# Patient Record
Sex: FEMALE | Race: OTHER | HISPANIC OR LATINO | Employment: UNEMPLOYED | URBAN - METROPOLITAN AREA
[De-identification: names, ages, dates, MRNs, and addresses within clinical notes are randomized per-mention and may not be internally consistent; named-entity substitution may affect disease eponyms.]

---

## 2017-02-01 ENCOUNTER — HOSPITAL ENCOUNTER (EMERGENCY)
Facility: HOSPITAL | Age: 22
Discharge: HOME | End: 2017-02-01
Attending: EMERGENCY MEDICINE
Payer: COMMERCIAL

## 2017-02-01 VITALS
RESPIRATION RATE: 16 BRPM | DIASTOLIC BLOOD PRESSURE: 55 MMHG | OXYGEN SATURATION: 100 % | TEMPERATURE: 98.1 F | SYSTOLIC BLOOD PRESSURE: 103 MMHG | HEART RATE: 75 BPM

## 2017-02-01 DIAGNOSIS — N39.0 URINARY TRACT INFECTION: ICD-10-CM

## 2017-02-01 DIAGNOSIS — R10.9 ABDOMINAL PAIN: ICD-10-CM

## 2017-02-01 DIAGNOSIS — Z34.90 INTRAUTERINE PREGNANCY: Primary | ICD-10-CM

## 2017-02-01 LAB
ALBUMIN SERPL BCP-MCNC: 4 G/DL (ref 3.5–5)
ALP SERPL-CCNC: 79 U/L (ref 46–116)
ALT SERPL W P-5'-P-CCNC: 15 U/L (ref 12–78)
ANION GAP SERPL CALCULATED.3IONS-SCNC: 8 MMOL/L (ref 4–13)
AST SERPL W P-5'-P-CCNC: 13 U/L (ref 5–45)
B-HCG SERPL-ACNC: ABNORMAL MIU/ML
BACTERIA UR QL AUTO: ABNORMAL /HPF
BASOPHILS # BLD AUTO: 0.02 THOUSANDS/ΜL (ref 0–0.1)
BASOPHILS NFR BLD AUTO: 0 % (ref 0–1)
BILIRUB SERPL-MCNC: 0.14 MG/DL (ref 0.2–1)
BILIRUB UR QL STRIP: NEGATIVE
BUN SERPL-MCNC: 13 MG/DL (ref 5–25)
CALCIUM SERPL-MCNC: 9.1 MG/DL (ref 8.3–10.1)
CHLORIDE SERPL-SCNC: 102 MMOL/L (ref 100–108)
CLARITY UR: CLEAR
CO2 SERPL-SCNC: 28 MMOL/L (ref 21–32)
COLOR UR: YELLOW
CREAT SERPL-MCNC: 0.69 MG/DL (ref 0.6–1.3)
EOSINOPHIL # BLD AUTO: 0.08 THOUSAND/ΜL (ref 0–0.61)
EOSINOPHIL NFR BLD AUTO: 1 % (ref 0–6)
ERYTHROCYTE [DISTWIDTH] IN BLOOD BY AUTOMATED COUNT: 13.5 % (ref 11.6–15.1)
GFR SERPL CREATININE-BSD FRML MDRD: >60 ML/MIN/1.73SQ M
GLUCOSE SERPL-MCNC: 80 MG/DL (ref 65–140)
GLUCOSE UR STRIP-MCNC: NEGATIVE MG/DL
HCG UR QL: POSITIVE
HCT VFR BLD AUTO: 38.7 % (ref 34.8–46.1)
HGB BLD-MCNC: 13.1 G/DL (ref 11.5–15.4)
HGB UR QL STRIP.AUTO: NEGATIVE
KETONES UR STRIP-MCNC: NEGATIVE MG/DL
LEUKOCYTE ESTERASE UR QL STRIP: ABNORMAL
LIPASE SERPL-CCNC: 190 U/L (ref 73–393)
LYMPHOCYTES # BLD AUTO: 2.77 THOUSANDS/ΜL (ref 0.6–4.47)
LYMPHOCYTES NFR BLD AUTO: 35 % (ref 14–44)
MCH RBC QN AUTO: 28.7 PG (ref 26.8–34.3)
MCHC RBC AUTO-ENTMCNC: 33.9 G/DL (ref 31.4–37.4)
MCV RBC AUTO: 85 FL (ref 82–98)
MONOCYTES # BLD AUTO: 0.55 THOUSAND/ΜL (ref 0.17–1.22)
MONOCYTES NFR BLD AUTO: 7 % (ref 4–12)
NEUTROPHILS # BLD AUTO: 4.61 THOUSANDS/ΜL (ref 1.85–7.62)
NEUTS SEG NFR BLD AUTO: 57 % (ref 43–75)
NITRITE UR QL STRIP: NEGATIVE
NON-SQ EPI CELLS URNS QL MICRO: ABNORMAL /HPF
NRBC BLD AUTO-RTO: 0 /100 WBCS
PH UR STRIP.AUTO: 6 [PH] (ref 4.5–8)
PLATELET # BLD AUTO: 283 THOUSANDS/UL (ref 149–390)
PMV BLD AUTO: 9.3 FL (ref 8.9–12.7)
POTASSIUM SERPL-SCNC: 3.4 MMOL/L (ref 3.5–5.3)
PROT SERPL-MCNC: 7.8 G/DL (ref 6.4–8.2)
PROT UR STRIP-MCNC: ABNORMAL MG/DL
RBC # BLD AUTO: 4.56 MILLION/UL (ref 3.81–5.12)
RBC #/AREA URNS AUTO: ABNORMAL /HPF
SODIUM SERPL-SCNC: 138 MMOL/L (ref 136–145)
SP GR UR STRIP.AUTO: >=1.03 (ref 1–1.03)
UROBILINOGEN UR QL STRIP.AUTO: 0.2 E.U./DL
WBC # BLD AUTO: 8.03 THOUSAND/UL (ref 4.31–10.16)
WBC #/AREA URNS AUTO: ABNORMAL /HPF

## 2017-02-01 PROCEDURE — 81001 URINALYSIS AUTO W/SCOPE: CPT

## 2017-02-01 PROCEDURE — 36415 COLL VENOUS BLD VENIPUNCTURE: CPT

## 2017-02-01 PROCEDURE — 87086 URINE CULTURE/COLONY COUNT: CPT

## 2017-02-01 PROCEDURE — 81025 URINE PREGNANCY TEST: CPT

## 2017-02-01 PROCEDURE — 85025 COMPLETE CBC W/AUTO DIFF WBC: CPT

## 2017-02-01 PROCEDURE — 84702 CHORIONIC GONADOTROPIN TEST: CPT | Performed by: EMERGENCY MEDICINE

## 2017-02-01 PROCEDURE — 81002 URINALYSIS NONAUTO W/O SCOPE: CPT

## 2017-02-01 PROCEDURE — 99284 EMERGENCY DEPT VISIT MOD MDM: CPT

## 2017-02-01 PROCEDURE — 80053 COMPREHEN METABOLIC PANEL: CPT

## 2017-02-01 PROCEDURE — 83690 ASSAY OF LIPASE: CPT | Performed by: EMERGENCY MEDICINE

## 2017-02-01 RX ORDER — ACETAMINOPHEN 325 MG/1
650 TABLET ORAL ONCE
Status: COMPLETED | OUTPATIENT
Start: 2017-02-01 | End: 2017-02-01

## 2017-02-01 RX ORDER — CEPHALEXIN 250 MG/1
500 CAPSULE ORAL ONCE
Status: COMPLETED | OUTPATIENT
Start: 2017-02-01 | End: 2017-02-01

## 2017-02-01 RX ORDER — CEPHALEXIN 500 MG/1
500 CAPSULE ORAL 2 TIMES DAILY
Qty: 14 CAPSULE | Refills: 0 | Status: SHIPPED | OUTPATIENT
Start: 2017-02-01 | End: 2017-02-08

## 2017-02-01 RX ADMIN — CEPHALEXIN 500 MG: 250 CAPSULE ORAL at 22:52

## 2017-02-01 RX ADMIN — ACETAMINOPHEN 650 MG: 325 TABLET, FILM COATED ORAL at 22:15

## 2017-02-02 LAB — BACTERIA UR CULT: NORMAL

## 2017-02-24 ENCOUNTER — HOSPITAL ENCOUNTER (EMERGENCY)
Facility: HOSPITAL | Age: 22
Discharge: HOME | End: 2017-02-24
Attending: EMERGENCY MEDICINE | Admitting: EMERGENCY MEDICINE
Payer: COMMERCIAL

## 2017-02-24 VITALS
WEIGHT: 127 LBS | RESPIRATION RATE: 14 BRPM | DIASTOLIC BLOOD PRESSURE: 54 MMHG | SYSTOLIC BLOOD PRESSURE: 100 MMHG | OXYGEN SATURATION: 100 % | HEART RATE: 89 BPM | TEMPERATURE: 97.9 F

## 2017-02-24 DIAGNOSIS — R11.10 VOMITING: Primary | ICD-10-CM

## 2017-02-24 DIAGNOSIS — E86.0 DEHYDRATION: ICD-10-CM

## 2017-02-24 DIAGNOSIS — Z3A.10 10 WEEKS GESTATION OF PREGNANCY: ICD-10-CM

## 2017-02-24 LAB
ALBUMIN SERPL BCP-MCNC: 3.7 G/DL (ref 3.5–5)
ALP SERPL-CCNC: 64 U/L (ref 46–116)
ALT SERPL W P-5'-P-CCNC: 18 U/L (ref 12–78)
ANION GAP SERPL CALCULATED.3IONS-SCNC: 14 MMOL/L (ref 4–13)
AST SERPL W P-5'-P-CCNC: 16 U/L (ref 5–45)
BASOPHILS # BLD MANUAL: 0 THOUSAND/UL (ref 0–0.1)
BASOPHILS NFR MAR MANUAL: 0 % (ref 0–1)
BILIRUB SERPL-MCNC: 0.58 MG/DL (ref 0.2–1)
BUN SERPL-MCNC: 10 MG/DL (ref 5–25)
CALCIUM SERPL-MCNC: 8.9 MG/DL (ref 8.3–10.1)
CHLORIDE SERPL-SCNC: 101 MMOL/L (ref 100–108)
CO2 SERPL-SCNC: 24 MMOL/L (ref 21–32)
CREAT SERPL-MCNC: 0.81 MG/DL (ref 0.6–1.3)
EOSINOPHIL # BLD MANUAL: 0 THOUSAND/UL (ref 0–0.4)
EOSINOPHIL NFR BLD MANUAL: 0 % (ref 0–6)
ERYTHROCYTE [DISTWIDTH] IN BLOOD BY AUTOMATED COUNT: 13.8 % (ref 11.6–15.1)
GFR SERPL CREATININE-BSD FRML MDRD: >60 ML/MIN/1.73SQ M
GLUCOSE SERPL-MCNC: 124 MG/DL (ref 65–140)
HCT VFR BLD AUTO: 41.6 % (ref 34.8–46.1)
HGB BLD-MCNC: 14.6 G/DL (ref 11.5–15.4)
LYMPHOCYTES # BLD AUTO: 0.62 THOUSAND/UL (ref 0.6–4.47)
LYMPHOCYTES # BLD AUTO: 8 % (ref 14–44)
MCH RBC QN AUTO: 29.4 PG (ref 26.8–34.3)
MCHC RBC AUTO-ENTMCNC: 35.1 G/DL (ref 31.4–37.4)
MCV RBC AUTO: 84 FL (ref 82–98)
MONOCYTES # BLD AUTO: 0.08 THOUSAND/UL (ref 0–1.22)
MONOCYTES NFR BLD: 1 % (ref 4–12)
NEUTROPHILS # BLD MANUAL: 7.1 THOUSAND/UL (ref 1.85–7.62)
NEUTS BAND NFR BLD MANUAL: 13 % (ref 0–8)
NEUTS SEG NFR BLD AUTO: 78 % (ref 43–75)
NRBC BLD AUTO-RTO: 0 /100 WBCS
PLATELET # BLD AUTO: 200 THOUSANDS/UL (ref 149–390)
PLATELET BLD QL SMEAR: ADEQUATE
PMV BLD AUTO: 9.5 FL (ref 8.9–12.7)
POTASSIUM SERPL-SCNC: 3.5 MMOL/L (ref 3.5–5.3)
PROT SERPL-MCNC: 8 G/DL (ref 6.4–8.2)
RBC # BLD AUTO: 4.96 MILLION/UL (ref 3.81–5.12)
RBC MORPH BLD: NORMAL
SODIUM SERPL-SCNC: 139 MMOL/L (ref 136–145)
TOTAL CELLS COUNTED SPEC: 100
WBC # BLD AUTO: 7.8 THOUSAND/UL (ref 4.31–10.16)

## 2017-02-24 PROCEDURE — 85007 BL SMEAR W/DIFF WBC COUNT: CPT | Performed by: EMERGENCY MEDICINE

## 2017-02-24 PROCEDURE — 96361 HYDRATE IV INFUSION ADD-ON: CPT

## 2017-02-24 PROCEDURE — 36415 COLL VENOUS BLD VENIPUNCTURE: CPT | Performed by: EMERGENCY MEDICINE

## 2017-02-24 PROCEDURE — 99283 EMERGENCY DEPT VISIT LOW MDM: CPT

## 2017-02-24 PROCEDURE — 80053 COMPREHEN METABOLIC PANEL: CPT | Performed by: EMERGENCY MEDICINE

## 2017-02-24 PROCEDURE — 85027 COMPLETE CBC AUTOMATED: CPT | Performed by: EMERGENCY MEDICINE

## 2017-02-24 PROCEDURE — 96374 THER/PROPH/DIAG INJ IV PUSH: CPT

## 2017-02-24 RX ORDER — METOCLOPRAMIDE HYDROCHLORIDE 5 MG/ML
10 INJECTION INTRAMUSCULAR; INTRAVENOUS ONCE
Status: COMPLETED | OUTPATIENT
Start: 2017-02-24 | End: 2017-02-24

## 2017-02-24 RX ORDER — METOCLOPRAMIDE 10 MG/1
10 TABLET ORAL EVERY 6 HOURS PRN
Qty: 30 TABLET | Refills: 0 | Status: SHIPPED | OUTPATIENT
Start: 2017-02-24 | End: 2017-03-06

## 2017-02-24 RX ADMIN — SODIUM CHLORIDE 1000 ML: 0.9 INJECTION, SOLUTION INTRAVENOUS at 05:57

## 2017-02-24 RX ADMIN — METOCLOPRAMIDE 10 MG: 5 INJECTION, SOLUTION INTRAMUSCULAR; INTRAVENOUS at 05:56

## 2018-10-06 PROCEDURE — 99284 EMERGENCY DEPT VISIT MOD MDM: CPT

## 2018-10-07 ENCOUNTER — APPOINTMENT (EMERGENCY)
Dept: ULTRASOUND IMAGING | Facility: HOSPITAL | Age: 23
End: 2018-10-07
Payer: COMMERCIAL

## 2018-10-07 ENCOUNTER — HOSPITAL ENCOUNTER (EMERGENCY)
Facility: HOSPITAL | Age: 23
Discharge: HOME | End: 2018-10-07
Attending: EMERGENCY MEDICINE | Admitting: EMERGENCY MEDICINE
Payer: COMMERCIAL

## 2018-10-07 VITALS
RESPIRATION RATE: 16 BRPM | WEIGHT: 125 LBS | OXYGEN SATURATION: 98 % | HEART RATE: 73 BPM | SYSTOLIC BLOOD PRESSURE: 94 MMHG | TEMPERATURE: 98 F | DIASTOLIC BLOOD PRESSURE: 51 MMHG

## 2018-10-07 DIAGNOSIS — O26.899 PELVIC PAIN DURING PREGNANCY: Primary | ICD-10-CM

## 2018-10-07 DIAGNOSIS — B37.3 VAGINAL YEAST INFECTION: ICD-10-CM

## 2018-10-07 DIAGNOSIS — R10.2 PELVIC PAIN DURING PREGNANCY: Primary | ICD-10-CM

## 2018-10-07 LAB
ABO GROUP BLD: NORMAL
ALBUMIN SERPL BCP-MCNC: 3.2 G/DL (ref 3.5–5)
ALP SERPL-CCNC: 60 U/L (ref 46–116)
ALT SERPL W P-5'-P-CCNC: 11 U/L (ref 12–78)
ANION GAP SERPL CALCULATED.3IONS-SCNC: 6 MMOL/L (ref 4–13)
AST SERPL W P-5'-P-CCNC: 12 U/L (ref 5–45)
B-HCG SERPL-ACNC: ABNORMAL MIU/ML
BACTERIA UR QL AUTO: ABNORMAL /HPF
BASOPHILS # BLD AUTO: 0.03 THOUSANDS/ΜL (ref 0–0.1)
BASOPHILS NFR BLD AUTO: 1 % (ref 0–1)
BILIRUB SERPL-MCNC: 0.23 MG/DL (ref 0.2–1)
BILIRUB UR QL STRIP: NEGATIVE
BLD GP AB SCN SERPL QL: NEGATIVE
BUN SERPL-MCNC: 8 MG/DL (ref 5–25)
CALCIUM SERPL-MCNC: 8.9 MG/DL (ref 8.3–10.1)
CHLORIDE SERPL-SCNC: 104 MMOL/L (ref 100–108)
CLARITY UR: CLEAR
CO2 SERPL-SCNC: 26 MMOL/L (ref 21–32)
COLOR UR: YELLOW
COLOR, POC: YELLOW
CREAT SERPL-MCNC: 0.57 MG/DL (ref 0.6–1.3)
EOSINOPHIL # BLD AUTO: 0.05 THOUSAND/ΜL (ref 0–0.61)
EOSINOPHIL NFR BLD AUTO: 1 % (ref 0–6)
ERYTHROCYTE [DISTWIDTH] IN BLOOD BY AUTOMATED COUNT: 13.9 % (ref 11.6–15.1)
GFR SERPL CREATININE-BSD FRML MDRD: 132 ML/MIN/1.73SQ M
GLUCOSE SERPL-MCNC: 87 MG/DL (ref 65–140)
GLUCOSE UR STRIP-MCNC: NEGATIVE MG/DL
HCT VFR BLD AUTO: 35.7 % (ref 34.8–46.1)
HGB BLD-MCNC: 11.1 G/DL (ref 11.5–15.4)
HGB UR QL STRIP.AUTO: NEGATIVE
IMM GRANULOCYTES # BLD AUTO: 0.01 THOUSAND/UL (ref 0–0.2)
IMM GRANULOCYTES NFR BLD AUTO: 0 % (ref 0–2)
KETONES UR STRIP-MCNC: NEGATIVE MG/DL
LEUKOCYTE ESTERASE UR QL STRIP: ABNORMAL
LYMPHOCYTES # BLD AUTO: 2.16 THOUSANDS/ΜL (ref 0.6–4.47)
LYMPHOCYTES NFR BLD AUTO: 36 % (ref 14–44)
MCH RBC QN AUTO: 26.9 PG (ref 26.8–34.3)
MCHC RBC AUTO-ENTMCNC: 31.1 G/DL (ref 31.4–37.4)
MCV RBC AUTO: 86 FL (ref 82–98)
MONOCYTES # BLD AUTO: 0.41 THOUSAND/ΜL (ref 0.17–1.22)
MONOCYTES NFR BLD AUTO: 7 % (ref 4–12)
NEUTROPHILS # BLD AUTO: 3.32 THOUSANDS/ΜL (ref 1.85–7.62)
NEUTS SEG NFR BLD AUTO: 55 % (ref 43–75)
NITRITE UR QL STRIP: NEGATIVE
NON-SQ EPI CELLS URNS QL MICRO: ABNORMAL /HPF
NRBC BLD AUTO-RTO: 0 /100 WBCS
PH UR STRIP.AUTO: 6 [PH] (ref 4.5–8)
PLATELET # BLD AUTO: 260 THOUSANDS/UL (ref 149–390)
PMV BLD AUTO: 9.6 FL (ref 8.9–12.7)
POTASSIUM SERPL-SCNC: 4.2 MMOL/L (ref 3.5–5.3)
PROT SERPL-MCNC: 6.9 G/DL (ref 6.4–8.2)
PROT UR STRIP-MCNC: NEGATIVE MG/DL
RBC # BLD AUTO: 4.13 MILLION/UL (ref 3.81–5.12)
RBC #/AREA URNS AUTO: ABNORMAL /HPF
RH BLD: NEGATIVE
SODIUM SERPL-SCNC: 136 MMOL/L (ref 136–145)
SP GR UR STRIP.AUTO: >=1.03 (ref 1–1.03)
SPECIMEN EXPIRATION DATE: NORMAL
UROBILINOGEN UR QL STRIP.AUTO: 0.2 E.U./DL
WBC # BLD AUTO: 5.98 THOUSAND/UL (ref 4.31–10.16)
WBC #/AREA URNS AUTO: ABNORMAL /HPF

## 2018-10-07 PROCEDURE — 76801 OB US < 14 WKS SINGLE FETUS: CPT

## 2018-10-07 PROCEDURE — 84702 CHORIONIC GONADOTROPIN TEST: CPT | Performed by: PHYSICIAN ASSISTANT

## 2018-10-07 PROCEDURE — 87086 URINE CULTURE/COLONY COUNT: CPT

## 2018-10-07 PROCEDURE — 86901 BLOOD TYPING SEROLOGIC RH(D): CPT | Performed by: PHYSICIAN ASSISTANT

## 2018-10-07 PROCEDURE — 85025 COMPLETE CBC W/AUTO DIFF WBC: CPT | Performed by: PHYSICIAN ASSISTANT

## 2018-10-07 PROCEDURE — 80053 COMPREHEN METABOLIC PANEL: CPT | Performed by: PHYSICIAN ASSISTANT

## 2018-10-07 PROCEDURE — 81001 URINALYSIS AUTO W/SCOPE: CPT

## 2018-10-07 PROCEDURE — 36415 COLL VENOUS BLD VENIPUNCTURE: CPT | Performed by: PHYSICIAN ASSISTANT

## 2018-10-07 PROCEDURE — 86900 BLOOD TYPING SEROLOGIC ABO: CPT | Performed by: PHYSICIAN ASSISTANT

## 2018-10-07 PROCEDURE — 86850 RBC ANTIBODY SCREEN: CPT | Performed by: PHYSICIAN ASSISTANT

## 2018-10-07 RX ORDER — ACETAMINOPHEN 325 MG/1
975 TABLET ORAL ONCE
Status: DISCONTINUED | OUTPATIENT
Start: 2018-10-07 | End: 2018-10-07 | Stop reason: HOSPADM

## 2018-10-07 RX ORDER — ACETAMINOPHEN 500 MG
500 TABLET ORAL EVERY 6 HOURS PRN
Qty: 30 TABLET | Refills: 0 | Status: SHIPPED | OUTPATIENT
Start: 2018-10-07 | End: 2019-05-14 | Stop reason: ALTCHOICE

## 2018-10-07 RX ORDER — CLOTRIMAZOLE 1 %
1 CREAM WITH APPLICATOR VAGINAL
Qty: 45 G | Refills: 0 | Status: SHIPPED | OUTPATIENT
Start: 2018-10-07 | End: 2018-10-14

## 2018-10-07 NOTE — ED NOTES
Pt reports having low abd pain and white vaginal discharge  Pt states she found out she was pregnant in August but has had no prenatal care  Pt denies urinary symptoms  Pt had diarrhea today and states nausea with pregnancy is normal for her        Micki Hamilton RN  10/07/18 0551

## 2018-10-07 NOTE — DISCHARGE INSTRUCTIONS
Abdominal Pain in Pregnancy   WHAT YOU NEED TO KNOW:   Abdominal pain during pregnancy is common  Some of the causes include heartburn, constipation, gas, false labor, and round ligament pain  Round ligament pain is caused by stretching of the ligaments that support your uterus  Abdominal pain may be caused by a health problem, such as a stomach virus or appendicitis (inflammation of the appendix)  The pain may also be caused by a problem with your pregnancy, such as a threatened miscarriage or  labor  DISCHARGE INSTRUCTIONS:   Follow up with your obstetrician within 3 days:  Write down your questions so you remember to ask them during your visits  Self-care:   · Rest may help to relieve round ligament pain  Ask your healthcare provider about other ways to relieve this pain, such as a supportive belt or pregnancy exercises  · Use a heating pad set to the lowest setting or a hot compress to apply heat to your abdomen  Do this for 20 to 30 minutes every 2 hours for as many days as directed  · Avoid quick changes in position or movements that cause pain  · Do not lie flat in bed or bend over if you have heartburn  Ask your obstetrician if you should make any changes to the foods you eat  Ask if you can take any medicines for heartburn  · Eat more fiber and drink more liquids to relieve constipation  Fiber is found in fruits, vegetables, and whole-grain foods, such as whole-wheat bread and cereals  Ask how much liquid to drink each day and which liquids are best for you  Contact your obstetrician if:  · You continue to have abdominal pain that cannot be relieved  · You have a fever  · You have questions or concerns about your condition or care  Return to the emergency department if:   · You have sudden, severe pain or cramps that are so bad that you cannot walk or talk  · You have a fast heartbeat, shortness of breath, and you feel lightheaded or faint       · You have vaginal bleeding or discharge  · You have nausea, vomiting, fever, and severe pain on your right side  © 2017 [employer] Information is for End User's use only and may not be sold, redistributed or otherwise used for commercial purposes  All illustrations and images included in CareNotes® are the copyrighted property of A D A M , Inc  or Matt Zap[employer]  The above information is an  only  It is not intended as medical advice for individual conditions or treatments  Talk to your doctor, nurse or pharmacist before following any medical regimen to see if it is safe and effective for you  Vulvovaginal Candidiasis   WHAT YOU NEED TO KNOW:   Vulvovaginal candidiasis, or yeast infection, is a common vaginal infection  Vulvovaginal candidiasis is caused by a fungus, or yeast-like germ  Fungi are normally found in your vagina  When there are too many fungi, it can cause an infection  DISCHARGE INSTRUCTIONS:   Return to the emergency department if:   · You have fever and chills  · You are bleeding from your vagina and it is not your monthly period  · You develop abdominal or pelvic pain  Contact your healthcare provider if:   · Your signs and symptoms get worse, even after treatment  · You have questions or concerns about your condition or care  Medicines:   · Medicines  help treat the fungal infection and decrease inflammation  The medicine may be a pill, topical cream, or vaginal suppository  · Take your medicine as directed  Contact your healthcare provider if you think your medicine is not helping or if you have side effects  Tell him of her if you are allergic to any medicine  Keep a list of the medicines, vitamins, and herbs you take  Include the amounts, and when and why you take them  Bring the list or the pill bottles to follow-up visits  Carry your medicine list with you in case of an emergency  Manage your symptoms:   · Wear cotton underwear      · Keep the vaginal area clean and dry  · Do not have sex until your symptoms are gone  · Do not douche  · Do not use feminine hygiene sprays, powders, or bubble bath  Prevent another infection:   · Take showers instead of baths  · Eat yogurt  · Limit the amount of alcohol you drink'    · Control your blood sugar if you are diabetic  · Limit your time in hot tubs  Follow up with your healthcare provider as directed:  Write down your questions so you remember to ask them during your visits  © 2017 [address] [facility] Information is for End User's use only and may not be sold, redistributed or otherwise used for commercial purposes  All illustrations and images included in CareNotes® are the copyrighted property of A D A M , Inc  or [employer]  The above information is an  only  It is not intended as medical advice for individual conditions or treatments  Talk to your doctor, nurse or pharmacist before following any medical regimen to see if it is safe and effective for you

## 2018-10-07 NOTE — ED PROVIDER NOTES
History  Chief Complaint   Patient presents with    Abdominal Pain Pregnant     Patient reports is roughly three months pregnant, positive at home pregnancy test in july, no OBGYN follow up  Reporting abdominal pain which began three days ago  Also reports white vaginal discharge  History provided by:  Patient   used: No    Abdominal Pain   Pain location:  Suprapubic  Pain quality: cramping and sharp    Pain radiates to:  Does not radiate  Pain severity:  Moderate  Onset quality:  Gradual  Duration:  3 days  Timing:  Constant  Progression:  Worsening  Chronicity:  New  Context comment:  Preg  Relieved by:  Nothing  Worsened by: Movement  Ineffective treatments:  Acetaminophen  Associated symptoms: vaginal discharge    Associated symptoms: no anorexia, no chest pain, no fatigue, no fever, no nausea and no shortness of breath    Risk factors: pregnancy    Risk factors: not obese        None       Past Medical History:   Diagnosis Date    Herpes simplex        History reviewed  No pertinent surgical history  History reviewed  No pertinent family history  I have reviewed and agree with the history as documented  Social History   Substance Use Topics    Smoking status: Never Smoker    Smokeless tobacco: Never Used    Alcohol use No        Review of Systems   Constitutional: Negative for fatigue and fever  Respiratory: Negative for shortness of breath  Cardiovascular: Negative for chest pain  Gastrointestinal: Positive for abdominal pain  Negative for anorexia and nausea  Genitourinary: Positive for vaginal discharge  Physical Exam  Physical Exam   Constitutional: She appears well-developed and well-nourished  HENT:   Head: Normocephalic and atraumatic  Eyes: Conjunctivae are normal    Neck: Neck supple  No JVD present  Cardiovascular: Normal rate  Pulmonary/Chest: Effort normal    Abdominal: Normal appearance   She exhibits no distension, no fluid wave and no ascites  There is no hepatosplenomegaly  There is no rigidity, no rebound, no guarding, no CVA tenderness and negative William's sign  Genitourinary:   Genitourinary Comments: Pt denies bleeding exam is deferred at this time  Musculoskeletal: She exhibits no edema or deformity  Neurological: She is alert  Skin: Skin is warm  Capillary refill takes less than 2 seconds  Psychiatric: She has a normal mood and affect         Vital Signs  ED Triage Vitals [10/06/18 2212]   Temperature Pulse Respirations Blood Pressure SpO2   98 °F (36 7 °C) 88 18 (!) 93/48 99 %      Temp Source Heart Rate Source Patient Position - Orthostatic VS BP Location FiO2 (%)   Oral Monitor Sitting Right arm --      Pain Score       8           Vitals:    10/06/18 2212 10/07/18 0208   BP: (!) 93/48 94/51   Pulse: 88 73   Patient Position - Orthostatic VS: Sitting Lying       Visual Acuity      ED Medications  Medications - No data to display    Diagnostic Studies  Results Reviewed     Procedure Component Value Units Date/Time    Urine culture [[id number]] Collected:  10/07/18 0055    Lab Status:  Final result Specimen:  Urine from Urine, Clean Catch Updated:  10/08/18 0751     Urine Culture No Growth <1000 cfu/mL    Quantitative hCG [[id number]]  (Abnormal) Collected:  10/07/18 0101    Lab Status:  Final result Specimen:  Blood from Arm, Left Updated:  10/07/18 0148     HCG, Quant 384,242 7 (H) mIU/mL     Narrative:          Expected Ranges:     Approximate               Approximate HCG  Gestation age          Concentration ( mIU/mL)  _____________          ______________________   Jaquan Nurse                      HCG values  0 2-1                       5-50  1-2                           2-3                         100-5000  3-4                         500-84694  4-5                         1000-67519  5-6                         [id number]  6-8                         [id number]  8-12                        [id number] Comprehensive metabolic panel [[id number]]  (Abnormal) Collected:  10/07/18 0101    Lab Status:  Final result Specimen:  Blood from Arm, Left Updated:  10/07/18 0122     Sodium 136 mmol/L      Potassium 4 2 mmol/L      Chloride 104 mmol/L      CO2 26 mmol/L      ANION GAP 6 mmol/L      BUN 8 mg/dL      Creatinine 0 57 (L) mg/dL      Glucose 87 mg/dL      Calcium 8 9 mg/dL      AST 12 U/L      ALT 11 (L) U/L      Alkaline Phosphatase 60 U/L      Total Protein 6 9 g/dL      Albumin 3 2 (L) g/dL      Total Bilirubin 0 23 mg/dL      eGFR 132 ml/min/1 73sq m     Narrative:         National Kidney Disease Education Program recommendations are as follows:  GFR calculation is accurate only with a steady state creatinine  Chronic Kidney disease less than 60 ml/min/1 73 sq  meters  Kidney failure less than 15 ml/min/1 73 sq  meters      CBC and differential [[id number]]  (Abnormal) Collected:  10/07/18 0101    Lab Status:  Final result Specimen:  Blood from Arm, Left Updated:  10/07/18 0108     WBC 5 98 Thousand/uL      RBC 4 13 Million/uL      Hemoglobin 11 1 (L) g/dL      Hematocrit 35 7 %      MCV 86 fL      MCH 26 9 pg      MCHC 31 1 (L) g/dL      RDW 13 9 %      MPV 9 6 fL      Platelets 529 Thousands/uL      nRBC 0 /100 WBCs      Neutrophils Relative 55 %      Immat GRANS % 0 %      Lymphocytes Relative 36 %      Monocytes Relative 7 %      Eosinophils Relative 1 %      Basophils Relative 1 %      Neutrophils Absolute 3 32 Thousands/µL      Immature Grans Absolute 0 01 Thousand/uL      Lymphocytes Absolute 2 16 Thousands/µL      Monocytes Absolute 0 41 Thousand/µL      Eosinophils Absolute 0 05 Thousand/µL      Basophils Absolute 0 03 Thousands/µL     Urine Microscopic [[id number]]  (Abnormal) Collected:  10/07/18 0055    Lab Status:  Final result Specimen:  Urine from Urine, Clean Catch Updated:  10/07/18 0102     RBC, UA None Seen /hpf      WBC, UA 2-4 (A) /hpf      Epithelial Cells Moderate (A) /hpf      Bacteria, UA Moderate (A) /hpf     POCT urinalysis dipstick [[id number]]  (Abnormal) Resulted:  10/07/18 0046    Lab Status:  Final result Updated:  10/07/18 0046     Color, UA yellow    ED Urine Macroscopic [[id number]]  (Abnormal) Collected:  10/07/18 0055    Lab Status:  Final result Specimen:  Urine Updated:  10/07/18 0044     Color, UA Yellow     Clarity, UA Clear     pH, UA 6 0     Leukocytes, UA Small (A)     Nitrite, UA Negative     Protein, UA Negative mg/dl      Glucose, UA Negative mg/dl      Ketones, UA Negative mg/dl      Urobilinogen, UA 0 2 E U /dl      Bilirubin, UA Negative     Blood, UA Negative     Specific Gravity, UA >=1 030    Narrative:       CLINITEK RESULT                 US OB < 14 weeks with transvaginal   Final Result by Chelsie Tosacno MD (10/07 0346)      Single live intrauterine gestation at 12 weeks 4 days (range: 12 weeks 0 days-13 weeks 1 day)      DANIELLE: 4/17/2019      Workstation performed: [id number]                    Procedures  Procedures       Phone Contacts  ED Phone Contact    ED Course                               MDM  Number of Diagnoses or Management Options  Pelvic pain during pregnancy:   Vaginal yeast infection:   Diagnosis management comments: 25 yo female pos recent hx of preg  Pt w/o OB  GYn support at this time  Pt admits to mild pelvic pain  Pos hx of yeast infection and pt states symptoms are simular to past  Labs reviewed  US reviewed  Pt stable in department  At this time will treat vaginal yeast infection  Pt to f/u with known OB or [facility]  Pt given strict return precautions  Pt admits to understanding and agreement  Pt d/c in ambulatory smiling condition       CritCare Time    Disposition  Final diagnoses:   Pelvic pain during pregnancy   Vaginal yeast infection     Time reflects when diagnosis was documented in both MDM as applicable and the Disposition within this note     Time User Action Codes Description Comment    10/7/2018  3:57 AM Tammy Clark Add [O26 899,  R10 2] Pelvic pain during pregnancy     10/7/2018  3:57 AM Tani Matthews Add [B37 3] Vaginal yeast infection       ED Disposition     ED Disposition Condition Comment    Discharge  [facility] [address] discharge to home/self care  Condition at discharge: Stable        Follow-up Information     Follow up With Specialties Details Why [address] [place] [address] Obstetrics and Gynecology   [address] [phone]  [address], [address]          Discharge Medication List as of 10/7/2018  4:04 AM      START taking these medications    Details   acetaminophen (TYLENOL) 500 mg tablet Take 1 tablet (500 mg total) by mouth every 6 (six) hours as needed for mild pain, moderate pain, headaches or fever, Starting Sun 10/7/2018, Print      clotrimazole (GYNE-LOTRIMIN) 1 % vaginal cream Insert 1 applicator into the vagina daily at bedtime for 7 days, Starting Sun 10/7/2018, Until Sun 10/14/2018, Print           No discharge procedures on file      ED Provider  Electronically Signed by           Kennedy Matias PA-C  10/10/18 5159

## 2018-10-08 LAB — BACTERIA UR CULT: NORMAL

## 2018-11-01 ENCOUNTER — HOSPITAL ENCOUNTER (EMERGENCY)
Facility: HOSPITAL | Age: 23
Discharge: HOME | End: 2018-11-01
Attending: EMERGENCY MEDICINE
Payer: COMMERCIAL

## 2018-11-01 VITALS
TEMPERATURE: 97.8 F | SYSTOLIC BLOOD PRESSURE: 108 MMHG | DIASTOLIC BLOOD PRESSURE: 52 MMHG | RESPIRATION RATE: 16 BRPM | WEIGHT: 128 LBS | OXYGEN SATURATION: 100 % | HEART RATE: 79 BPM

## 2018-11-01 DIAGNOSIS — N89.8 VAGINAL DISCHARGE: Primary | ICD-10-CM

## 2018-11-01 DIAGNOSIS — N39.0 UTI (URINARY TRACT INFECTION): ICD-10-CM

## 2018-11-01 LAB
BACTERIA UR QL AUTO: ABNORMAL /HPF
BILIRUB UR QL STRIP: NEGATIVE
CLARITY UR: CLEAR
COLOR UR: YELLOW
COLOR, POC: YELLOW
EXT PREG TEST URINE: POSITIVE
GLUCOSE UR STRIP-MCNC: NEGATIVE MG/DL
HGB UR QL STRIP.AUTO: NEGATIVE
KETONES UR STRIP-MCNC: NEGATIVE MG/DL
LEUKOCYTE ESTERASE UR QL STRIP: ABNORMAL
NITRITE UR QL STRIP: NEGATIVE
NON-SQ EPI CELLS URNS QL MICRO: ABNORMAL /HPF
PH UR STRIP.AUTO: 7 [PH] (ref 4.5–8)
PROT UR STRIP-MCNC: NEGATIVE MG/DL
RBC #/AREA URNS AUTO: ABNORMAL /HPF
SP GR UR STRIP.AUTO: 1.02 (ref 1–1.03)
UROBILINOGEN UR QL STRIP.AUTO: 0.2 E.U./DL
WBC #/AREA URNS AUTO: ABNORMAL /HPF

## 2018-11-01 PROCEDURE — 87086 URINE CULTURE/COLONY COUNT: CPT

## 2018-11-01 PROCEDURE — 81025 URINE PREGNANCY TEST: CPT | Performed by: PHYSICIAN ASSISTANT

## 2018-11-01 PROCEDURE — 87591 N.GONORRHOEAE DNA AMP PROB: CPT | Performed by: PHYSICIAN ASSISTANT

## 2018-11-01 PROCEDURE — 87491 CHLMYD TRACH DNA AMP PROBE: CPT | Performed by: PHYSICIAN ASSISTANT

## 2018-11-01 PROCEDURE — 99283 EMERGENCY DEPT VISIT LOW MDM: CPT

## 2018-11-01 PROCEDURE — 81001 URINALYSIS AUTO W/SCOPE: CPT

## 2018-11-01 RX ORDER — METRONIDAZOLE 7.5 MG/G
1 GEL VAGINAL
Qty: 70 G | Refills: 0 | Status: SHIPPED | OUTPATIENT
Start: 2018-11-01 | End: 2018-11-06

## 2018-11-01 RX ORDER — CEPHALEXIN 500 MG/1
500 CAPSULE ORAL 4 TIMES DAILY
Qty: 28 CAPSULE | Refills: 0 | Status: SHIPPED | OUTPATIENT
Start: 2018-11-01 | End: 2018-11-08

## 2018-11-01 NOTE — ED PROVIDER NOTES
History  Chief Complaint   Patient presents with    Vaginal Swelling     Pt reports vaginal swelling started 2 weeks ago, pt also reports pain with urination and also reports vaginal discharge  Pt denies vaginal bleeding and denies any contractions  Pt reports being 16 weeks pregnant    Possible UTI     23y  o female with PMH of genital herpes presents to the ER for left sided vaginal swelling for 2 weeks  Patient denies taking any medication for symptoms  She describes her pain as burning and sharp  She denies radiation of pain  She rates her pain 6/10 and states it comes and goes  Associated symptoms: nausea, white/yellow vaginal discharge and dysuria  She denies fever, chills, chest pain, dyspnea, vomiting, diarrhea, abdominal pain, vaginal bleeding, hematuria, frequency, urgency, weakness or paresthesias  Patient is about 16 weeks pregnant  She has had no prenatal care due to insurance purposes and states she will not have insurance for another year  Patient states "I only know how far I am because you guys did an ultrasound here"  She is [id number]  History provided by:  Patient   used: No        Prior to Admission Medications   Prescriptions Last Dose Informant Patient Reported? Taking?   acetaminophen (TYLENOL) 500 mg tablet   No No   Sig: Take 1 tablet (500 mg total) by mouth every 6 (six) hours as needed for mild pain, moderate pain, headaches or fever      Facility-Administered Medications: None       Past Medical History:   Diagnosis Date    Herpes simplex        History reviewed  No pertinent surgical history  History reviewed  No pertinent family history  I have reviewed and agree with the history as documented  Social History   Substance Use Topics    Smoking status: Never Smoker    Smokeless tobacco: Never Used    Alcohol use No        Review of Systems   Constitutional: Negative for chills and fever  Eyes: Negative for redness     Respiratory: Negative for shortness of breath  Cardiovascular: Negative for chest pain  Gastrointestinal: Positive for nausea  Negative for abdominal pain, diarrhea and vomiting  Genitourinary: Positive for dysuria, vaginal discharge and vaginal pain  Negative for frequency, hematuria, urgency and vaginal bleeding  Musculoskeletal: Negative for neck stiffness  Skin: Negative for rash  Allergic/Immunologic: Negative for food allergies  Neurological: Negative for weakness and numbness  Physical Exam  Physical Exam   Constitutional:  Non-toxic appearance  No distress  HENT:   Head: Normocephalic and atraumatic  Right Ear: Tympanic membrane, external ear and ear canal normal  No drainage, swelling or tenderness  No foreign bodies  Tympanic membrane is not erythematous  No hemotympanum  Left Ear: Tympanic membrane, external ear and ear canal normal  No drainage, swelling or tenderness  No foreign bodies  Tympanic membrane is not erythematous  No hemotympanum  Nose: Nose normal    Mouth/Throat: Uvula is midline, oropharynx is clear and moist and mucous membranes are normal  No uvula swelling  No posterior oropharyngeal edema, posterior oropharyngeal erythema or tonsillar abscesses  No tonsillar exudate  Neck: Normal range of motion and phonation normal  Neck supple  No tracheal deviation present  Cardiovascular: Normal rate, regular rhythm, S1 normal, S2 normal and normal heart sounds  Exam reveals no gallop and no friction rub  No murmur heard  Pulmonary/Chest: Effort normal and breath sounds normal  No respiratory distress  She has no decreased breath sounds  She has no wheezes  She has no rhonchi  She has no rales  She exhibits no tenderness  Abdominal: Soft  Bowel sounds are normal  She exhibits no distension  There is tenderness in the suprapubic area  There is no rebound, no guarding and no CVA tenderness  Genitourinary: Pelvic exam was performed with patient supine   There is no rash, tenderness, lesion or injury on the right labia  There is no rash, tenderness, lesion or injury on the left labia  No erythema, tenderness or bleeding in the vagina  No foreign body in the vagina  No signs of injury around the vagina  Vaginal discharge (thick; white) found  Genitourinary Comments: RN present for exam  No vaginal swelling or abscess of labia seen  Neurological: She is alert  GCS eye subscore is 4  GCS verbal subscore is 5  GCS motor subscore is 6  Skin: Skin is warm and dry  No rash noted  Psychiatric: She has a normal mood and affect  Nursing note and vitals reviewed  Vital Signs  ED Triage Vitals [11/01/18 1654]   Temperature Pulse Respirations Blood Pressure SpO2   97 8 °F (36 6 °C) 79 16 108/52 100 %      Temp Source Heart Rate Source Patient Position - Orthostatic VS BP Location FiO2 (%)   Temporal Monitor Sitting Right arm --      Pain Score       6           Vitals:    11/01/18 1654   BP: 108/52   Pulse: 79   Patient Position - Orthostatic VS: Sitting       Visual Acuity      ED Medications  Medications - No data to display    Diagnostic Studies  Results Reviewed     Procedure Component Value Units Date/Time    Urine Microscopic [[id number]]  (Abnormal) Collected:  11/01/18 1728    Lab Status:  Final result Specimen:  Urine from Urine, Clean Catch Updated:  11/01/18 1757     RBC, UA None Seen /hpf      WBC, UA 2-4 (A) /hpf      Epithelial Cells Occasional /hpf      Bacteria, UA Occasional /hpf     Urine culture [[id number]] Collected:  11/01/18 1728    Lab Status: In process Specimen:  Urine from Urine, Clean Catch Updated:  11/01/18 1726    Chlamydia/GC amplified DNA by PCR [[id number]] Collected:  11/01/18 1717    Lab Status:   In process Specimen:  Urine from Urine, Other Updated:  11/01/18 1726    POCT urinalysis dipstick [[id number]]  (Normal) Resulted:  11/01/18 1718    Lab Status:  Final result Specimen:  Urine Updated:  11/01/18 1718     Color, UA yellow    POCT pregnancy, urine [[id number]] (Abnormal) Resulted:  11/01/18 1717    Lab Status:  Final result Updated:  11/01/18 1718     EXT PREG TEST UR (Ref: Negative) positive    ED Urine Macroscopic [[id number]]  (Abnormal) Collected:  11/01/18 1728    Lab Status:  Final result Specimen:  Urine Updated:  11/01/18 1715     Color, UA Yellow     Clarity, UA Clear     pH, UA 7 0     Leukocytes, UA Trace (A)     Nitrite, UA Negative     Protein, UA Negative mg/dl      Glucose, UA Negative mg/dl      Ketones, UA Negative mg/dl      Urobilinogen, UA 0 2 E U /dl      Bilirubin, UA Negative     Blood, UA Negative     Specific Gravity, UA 1 025    Narrative:       CLINITEK RESULT                 No orders to display              Procedures  Procedures       Phone Contacts  ED Phone Contact    ED Course                               MDM  Number of Diagnoses or Management Options  UTI (urinary tract infection): new and requires workup  Vaginal discharge: new and requires workup  Diagnosis management comments: DDX consists of but not limited to: abscess, STD, BV, yeast, UTI, kidney stone    Will check urine and pregnancy  Will do pelvic exam and check gc/chlamydia  At discharge, I instructed the patient to:  -follow up with pcp  -take keflex as prescribed for UTI  -use Metrogel as prescribed for vaginal discharge  -rest and drink plenty of fluids  -if positive for gc/chlamydia we will call to inform you  -follow up with the recommended women's clinic for symptoms and prenatal care  -return to the ER if symptoms worsened or new symptoms arose  Patient agreed to this plan and was stable at time of discharge         Amount and/or Complexity of Data Reviewed  Clinical lab tests: ordered and reviewed    Patient Progress  Patient progress: stable    CritCare Time    Disposition  Final diagnoses:   Vaginal discharge   UTI (urinary tract infection)     Time reflects when diagnosis was documented in both MDM as applicable and the Disposition within this note     Time User Action Codes Description Comment    11/1/2018  5:38 PM Tiana JENKINS Add [N89 8] Vaginal discharge     11/1/2018  5:58 PM Tiana JENKINS Add [N39 0] UTI (urinary tract infection)       ED Disposition     ED Disposition Condition Comment    Discharge  [facility] discharge to home/self care  Condition at discharge: Stable        Follow-up Information     Follow up With Specialties Details Why [address] Family Medicine Schedule an appointment as soon as possible for a visit in 1 day  [address] [place] [address]  [phone] [address]   [address], [place], [place], [phone]    [facility] Obstetrics and Gynecology Schedule an appointment as soon as possible for a visit in 1 day  [facility] [phone]  [address] [facility], [address], [place], [place], [address]          Discharge Medication List as of 11/1/2018  6:03 PM      START taking these medications    Details   cephalexin (KEFLEX) 500 mg capsule Take 1 capsule (500 mg total) by mouth 4 (four) times a day for 7 days, Starting Thu 11/1/2018, Until Thu 11/8/2018, Print      metroNIDAZOLE (METROGEL) 0 75 % vaginal gel Insert 1 application into the vagina daily at bedtime for 5 days, Starting Thu 11/1/2018, Until Tue 11/6/2018, Print         CONTINUE these medications which have NOT CHANGED    Details   acetaminophen (TYLENOL) 500 mg tablet Take 1 tablet (500 mg total) by mouth every 6 (six) hours as needed for mild pain, moderate pain, headaches or fever, Starting Sun 10/7/2018, Print           No discharge procedures on file      ED Provider  Electronically Signed by           Woo Villalpando PA-C  11/01/18 1459

## 2018-11-01 NOTE — DISCHARGE INSTRUCTIONS
Urinary Tract Infection in Pregnancy   WHAT YOU NEED TO KNOW:   A urinary tract infection (UTI) is caused by bacteria that get inside your urinary tract  The urinary tract includes your kidneys and bladder  UTIs are common in women, especially during pregnancy  This is because of changes in your immune system, hormones, and uterus  As your uterus grows, your bladder may not completely empty  Bacteria can grow in the urine left in your bladder and cause a UTI  UTIs during pregnancy can increase your risk for a kidney infection and  labor  DISCHARGE INSTRUCTIONS:   Return to the emergency department if:   · You are urinating very little or not at all  · You have severe pain  · You have a fever and chills  Contact your healthcare provider if:   · You have pain in the sides of your back  · You do not feel better after 2 days of treatment  · You are vomiting  · You have questions or concerns about your condition or care  Medicines:   · Antibiotics  help fight a bacterial infection  · Medicines  may be given to decrease pain and burning when you urinate  They will also help decrease the feeling that you need to urinate often  These medicines will make your urine orange or red  · Take your medicine as directed  Contact your healthcare provider if you think your medicine is not helping or if you have side effects  Tell him or her if you are allergic to any medicine  Keep a list of the medicines, vitamins, and herbs you take  Include the amounts, and when and why you take them  Bring the list or the pill bottles to follow-up visits  Carry your medicine list with you in case of an emergency  Prevent another UTI:   · Urinate when you feel the urge  Do not hold your urine  Urinate as soon as needed  Always urinate after you have sex  This helps flush out bacteria passed during sex  · Drink liquids as directed  Ask how much liquid to drink each day and which liquids are best for you  You may need to drink more fluids than usual to help flush bacteria out of your urinary tract  Do not drink caffeine or carbonated liquids  These drinks can irritate your bladder  Your healthcare provider may recommend cranberry juice to help prevent a UTI  · Wipe from front to back after you urinate or have a bowel movement  This will help prevent germs from getting into your urinary tract through your urethra  · Do pelvic muscle exercises often  Pelvic muscle exercises may help you start and stop urinating  Strong pelvic muscles may help you empty your bladder easier  Squeeze these muscles tightly for 5 seconds like you are trying to hold back urine  Then relax for 5 seconds  Gradually work up to squeezing for 10 seconds  Do 3 sets of 15 repetitions a day, or as directed  Follow up with your healthcare provider as directed: You may need to return for more urine tests  Write down your questions so you remember to ask them during your visits  © 2017 [address] [employer] Information is for End User's use only and may not be sold, redistributed or otherwise used for commercial purposes  All illustrations and images included in CareNotes® are the copyrighted property of [employer]  or Matt Zapata  The above information is an  only  It is not intended as medical advice for individual conditions or treatments  Talk to your doctor, nurse or pharmacist before following any medical regimen to see if it is safe and effective for you  Vaginal Discharge   WHAT YOU NEED TO KNOW:   Vaginal discharge is normal  It is usually clear or white and odorless  A change in the amount, smell, or color may indicate an underlying problem  Irritation, itching, or burning may also be a sign of a problem  Infection, a foreign body, or chemical irritants can cause abnormal vaginal discharge  Birth control pills, creams, or jellies may also cause abnormal vaginal discharge    DISCHARGE INSTRUCTIONS:   Medicines:   · Medicines  may help fight a fungal or bacterial infection  The medicine may be given as a pill  You may instead get a cream or gel you insert into your vagina  · Take your medicine as directed  Contact your healthcare provider if you think your medicine is not helping or if you have side effects  Tell him of her if you are allergic to any medicine  Keep a list of the medicines, vitamins, and herbs you take  Include the amounts, and when and why you take them  Bring the list or the pill bottles to follow-up visits  Carry your medicine list with you in case of an emergency  Contact your healthcare provider or gynecologist if:   · Your symptoms do not get better in 3 to 5 days  · You have trouble urinating, or you urinate often and with urgency  · You have abdominal pain or cramps  · Your discharge is bloody and it is not your monthly period  · You have pain with sexual intercourse  · You have a fever or chills  · You have low back pain or side pain  · You have questions or concerns about your condition or care  Self-care:   · Clean in and around your vagina with mild soap and warm water each day  Gently dry the area after washing  · Take a sitz bath  Fill a bathtub with 4 to 6 inches of warm water  You may also use a sitz bath pan that fits over a toilet  Sit in the sitz bath for 20 minutes  Do this 2 to 3 times a day, or as directed  The warm water can help decrease pain and swelling  · Do not wear tight-fitting clothes or undergarments  These can make your symptoms worse  · Ask about douching  Douching may help decrease your symptoms  Use a solution of 5 tablespoons of white vinegar mixed with 2 liters of warm water  · Do not have sex until your symptoms go away  Have your partner wear a condom until you complete your course of medication    Follow up with your healthcare provider or gynecologist in 1 week:  Write down your questions so you remember to ask them during your visits  © 2017 26[address] [facility] Information is for End User's use only and may not be sold, redistributed or otherwise used for commercial purposes  All illustrations and images included in CareNotes® are the copyrighted property of [employer]  or Matt Zapata  The above information is an  only  It is not intended as medical advice for individual conditions or treatments  Talk to your doctor, nurse or pharmacist before following any medical regimen to see if it is safe and effective for you  DISCHARGE INSTRUCTIONS:    FOLLOW UP WITH YOUR PRIMARY CARE PROVIDER OR THE [address] [place] [address]  MAKE AN APPOINTMENT TO BE SEEN  TAKE KEFLEX AS PRESCRIBED FOR UTI  IF RASH, SHORTNESS OF BREATH OR TROUBLE SWALLOWING, STOP TAKING THE MEDICATION AND BE SEEN  USE METROGEL AS PRESCRIBED FOR VAGINAL DISCHARGE  IF RASH, SHORTNESS OF BREATH OR TROUBLE SWALLOWING, STOP TAKING THE MEDICATION AND BE SEEN  REST AND DRINK PLENTY OF FLUIDS  FOLLOW UP WITH THE RECOMMENDED [address]  IF SYMPTOMS WORSEN OR NEW SYMPTOMS ARISE, RETURN TO THE ER TO BE SEEN

## 2018-11-02 LAB
BACTERIA UR CULT: NORMAL
CHLAMYDIA DNA CVX QL NAA+PROBE: NORMAL
N GONORRHOEA DNA GENITAL QL NAA+PROBE: NORMAL

## 2018-11-28 ENCOUNTER — INITIAL PRENATAL (OUTPATIENT)
Dept: OBGYN CLINIC | Facility: CLINIC | Age: 23
End: 2018-11-28

## 2018-11-28 VITALS
HEIGHT: 63 IN | WEIGHT: 132 LBS | DIASTOLIC BLOOD PRESSURE: 68 MMHG | BODY MASS INDEX: 23.39 KG/M2 | SYSTOLIC BLOOD PRESSURE: 108 MMHG | HEART RATE: 70 BPM

## 2018-11-28 DIAGNOSIS — Z3A.19 19 WEEKS GESTATION OF PREGNANCY: Primary | ICD-10-CM

## 2018-11-28 DIAGNOSIS — Z34.92 PRENATAL CARE IN SECOND TRIMESTER: ICD-10-CM

## 2018-11-28 PROCEDURE — 99211 OFF/OP EST MAY X REQ PHY/QHP: CPT

## 2018-11-28 NOTE — LETTER
[address]a Bailey Letter    Kj Quintana  1995  [address]       11/28/18          Kj Quintana is a patient and under our care in our office  Mei Steele's Estimated Date of Delivery: 4/18/19  Any questions or concerns feel free to contact our office       Thank you,    Mary Ellen Lyons, OLMAN      [facility]/[place]  [address] [place] [address]/[facility] 15  [facility] [place]  [phone]   [facility]/[address]  [phone]

## 2018-11-28 NOTE — PROGRESS NOTES
OB Intake  1  19 weeks gestation of pregnancy  - Ambulatory Referral to Maternal Fetal Medicine for level II ultrasound  - Prenatal Panel  - QUAD Screen    2  Prenatal care in second trimester    o Patient presents for OB intake interview and is starting late prenatal care  o Accompanied by: self    o     o Hx of  delivery prior to 36 weeks 6 days:   no  o LMP:   No LMP recorded  Patient is pregnant   o U/S date: 10/7/2018      o Estimated Date of Delivery: 19    - confirmed by  U/S    o Signs and Symptoms of pregnancy:    - Constipation:   no  - Headaches:   no  - Cramping/spotting:   no  - PICA cravings:   no  - Diabetes: If you answer yes, please order 1 hr gtt testing, 50grams   History of gestational diabetes   no   BMI >35    no   Advance maternal age >35   no   First degree relative with type 2 diabetes   no   History of PCOS   no   Current metformin use   no   Prior history of macrosomia or LGA   no  o Immunization Record  -   - There is no immunization history on file for this patient   o Tdap:  - Counseled to be given after 28 weeks  o Influenza vaccine discussed   Vaccinated:   Yes, per patient at another medical facility  o MRSA questionnaire:   negative  o Dental visit within last 6 months  - yes   - If no, recommendations discussed  Interview education:  Rebecca Chow Pregnancy Essentials booklet given to patient  Reviewed and explained   Handouts given at todays visit  o Rosemary Molina & me phone application guide  o [facility] support center  o CDCs Response to [address] [place] [address] Maternal Fetal Medicine  - Sequential screening pamphlet  - Cystic fibrosis pamphlet  o DANIELLE letter given    - [address] Work   - Dentist            MyChart activated (not 1518 years of age)  - No  - Code provided: yes    Interview done by: Noreen Jay RN 18

## 2018-12-05 ENCOUNTER — HOSPITAL ENCOUNTER (EMERGENCY)
Facility: HOSPITAL | Age: 23
Discharge: HOME | End: 2018-12-05
Attending: EMERGENCY MEDICINE | Admitting: OBSTETRICS & GYNECOLOGY
Payer: COMMERCIAL

## 2018-12-05 VITALS
OXYGEN SATURATION: 100 % | TEMPERATURE: 99.1 F | RESPIRATION RATE: 18 BRPM | HEART RATE: 98 BPM | DIASTOLIC BLOOD PRESSURE: 58 MMHG | SYSTOLIC BLOOD PRESSURE: 105 MMHG | BODY MASS INDEX: 23.39 KG/M2 | WEIGHT: 132.06 LBS

## 2018-12-05 DIAGNOSIS — R10.9 ABDOMINAL PAIN: ICD-10-CM

## 2018-12-05 DIAGNOSIS — K52.9 ACUTE GASTROENTERITIS: Primary | ICD-10-CM

## 2018-12-05 DIAGNOSIS — Z34.90 PREGNANCY: ICD-10-CM

## 2018-12-05 LAB
ALBUMIN SERPL BCP-MCNC: 2.8 G/DL (ref 3.5–5)
ALP SERPL-CCNC: 66 U/L (ref 46–116)
ALT SERPL W P-5'-P-CCNC: 11 U/L (ref 12–78)
ANION GAP SERPL CALCULATED.3IONS-SCNC: 9 MMOL/L (ref 4–13)
AST SERPL W P-5'-P-CCNC: 14 U/L (ref 5–45)
BACTERIA UR QL AUTO: ABNORMAL /HPF
BASOPHILS # BLD MANUAL: 0 THOUSAND/UL (ref 0–0.1)
BASOPHILS NFR MAR MANUAL: 0 % (ref 0–1)
BILIRUB SERPL-MCNC: 0.42 MG/DL (ref 0.2–1)
BILIRUB UR QL STRIP: ABNORMAL
BUN SERPL-MCNC: 8 MG/DL (ref 5–25)
CALCIUM SERPL-MCNC: 8 MG/DL (ref 8.3–10.1)
CHLORIDE SERPL-SCNC: 103 MMOL/L (ref 100–108)
CLARITY UR: CLEAR
CO2 SERPL-SCNC: 25 MMOL/L (ref 21–32)
COLOR UR: ABNORMAL
CREAT SERPL-MCNC: 0.61 MG/DL (ref 0.6–1.3)
EOSINOPHIL # BLD MANUAL: 0 THOUSAND/UL (ref 0–0.4)
EOSINOPHIL NFR BLD MANUAL: 0 % (ref 0–6)
ERYTHROCYTE [DISTWIDTH] IN BLOOD BY AUTOMATED COUNT: 13.7 % (ref 11.6–15.1)
GFR SERPL CREATININE-BSD FRML MDRD: 129 ML/MIN/1.73SQ M
GLUCOSE SERPL-MCNC: 93 MG/DL (ref 65–140)
GLUCOSE UR STRIP-MCNC: NEGATIVE MG/DL
HCT VFR BLD AUTO: 34.2 % (ref 34.8–46.1)
HGB BLD-MCNC: 10.8 G/DL (ref 11.5–15.4)
HGB UR QL STRIP.AUTO: NEGATIVE
KETONES UR STRIP-MCNC: ABNORMAL MG/DL
LEUKOCYTE ESTERASE UR QL STRIP: ABNORMAL
LIPASE SERPL-CCNC: 142 U/L (ref 73–393)
LYMPHOCYTES # BLD AUTO: 0.4 THOUSAND/UL (ref 0.6–4.47)
LYMPHOCYTES # BLD AUTO: 6 % (ref 14–44)
MAGNESIUM SERPL-MCNC: 1.7 MG/DL (ref 1.6–2.6)
MCH RBC QN AUTO: 27.9 PG (ref 26.8–34.3)
MCHC RBC AUTO-ENTMCNC: 31.6 G/DL (ref 31.4–37.4)
MCV RBC AUTO: 88 FL (ref 82–98)
MONOCYTES # BLD AUTO: 0.13 THOUSAND/UL (ref 0–1.22)
MONOCYTES NFR BLD: 2 % (ref 4–12)
NEUTROPHILS # BLD MANUAL: 6.16 THOUSAND/UL (ref 1.85–7.62)
NEUTS BAND NFR BLD MANUAL: 3 % (ref 0–8)
NEUTS SEG NFR BLD AUTO: 89 % (ref 43–75)
NITRITE UR QL STRIP: NEGATIVE
NON-SQ EPI CELLS URNS QL MICRO: ABNORMAL /HPF
NRBC BLD AUTO-RTO: 0 /100 WBCS
PH UR STRIP.AUTO: 6.5 [PH] (ref 4.5–8)
PLATELET # BLD AUTO: 207 THOUSANDS/UL (ref 149–390)
PLATELET BLD QL SMEAR: ADEQUATE
PMV BLD AUTO: 9.4 FL (ref 8.9–12.7)
POTASSIUM SERPL-SCNC: 4 MMOL/L (ref 3.5–5.3)
PROT SERPL-MCNC: 6.6 G/DL (ref 6.4–8.2)
PROT UR STRIP-MCNC: ABNORMAL MG/DL
RBC # BLD AUTO: 3.87 MILLION/UL (ref 3.81–5.12)
RBC #/AREA URNS AUTO: ABNORMAL /HPF
RBC MORPH BLD: NORMAL
SODIUM SERPL-SCNC: 137 MMOL/L (ref 136–145)
SP GR UR STRIP.AUTO: 1.02 (ref 1–1.03)
TOTAL CELLS COUNTED SPEC: 100
UROBILINOGEN UR QL STRIP.AUTO: 0.2 E.U./DL
WBC # BLD AUTO: 6.7 THOUSAND/UL (ref 4.31–10.16)
WBC #/AREA URNS AUTO: ABNORMAL /HPF

## 2018-12-05 PROCEDURE — 99285 EMERGENCY DEPT VISIT HI MDM: CPT

## 2018-12-05 PROCEDURE — 81001 URINALYSIS AUTO W/SCOPE: CPT

## 2018-12-05 PROCEDURE — 87086 URINE CULTURE/COLONY COUNT: CPT

## 2018-12-05 PROCEDURE — 83735 ASSAY OF MAGNESIUM: CPT | Performed by: EMERGENCY MEDICINE

## 2018-12-05 PROCEDURE — 96374 THER/PROPH/DIAG INJ IV PUSH: CPT

## 2018-12-05 PROCEDURE — 80053 COMPREHEN METABOLIC PANEL: CPT | Performed by: EMERGENCY MEDICINE

## 2018-12-05 PROCEDURE — 85007 BL SMEAR W/DIFF WBC COUNT: CPT | Performed by: EMERGENCY MEDICINE

## 2018-12-05 PROCEDURE — 85027 COMPLETE CBC AUTOMATED: CPT | Performed by: EMERGENCY MEDICINE

## 2018-12-05 PROCEDURE — 36415 COLL VENOUS BLD VENIPUNCTURE: CPT | Performed by: EMERGENCY MEDICINE

## 2018-12-05 PROCEDURE — 96361 HYDRATE IV INFUSION ADD-ON: CPT

## 2018-12-05 PROCEDURE — 99202 OFFICE O/P NEW SF 15 MIN: CPT

## 2018-12-05 PROCEDURE — 83690 ASSAY OF LIPASE: CPT | Performed by: EMERGENCY MEDICINE

## 2018-12-05 RX ORDER — ONDANSETRON 4 MG/1
4 TABLET, FILM COATED ORAL EVERY 12 HOURS PRN
Qty: 10 TABLET | Refills: 0 | Status: SHIPPED | OUTPATIENT
Start: 2018-12-05 | End: 2019-05-14 | Stop reason: ALTCHOICE

## 2018-12-05 RX ORDER — ONDANSETRON 2 MG/ML
4 INJECTION INTRAMUSCULAR; INTRAVENOUS ONCE
Status: COMPLETED | OUTPATIENT
Start: 2018-12-05 | End: 2018-12-05

## 2018-12-05 RX ORDER — DICYCLOMINE HCL 20 MG
20 TABLET ORAL EVERY 6 HOURS PRN
Qty: 12 TABLET | Refills: 0 | Status: SHIPPED | OUTPATIENT
Start: 2018-12-05 | End: 2019-05-14 | Stop reason: ALTCHOICE

## 2018-12-05 RX ORDER — ONDANSETRON 4 MG/1
4 TABLET, FILM COATED ORAL EVERY 6 HOURS
Qty: 12 TABLET | Refills: 0 | Status: SHIPPED | OUTPATIENT
Start: 2018-12-05 | End: 2019-05-14 | Stop reason: ALTCHOICE

## 2018-12-05 RX ADMIN — ONDANSETRON 4 MG: 2 INJECTION INTRAMUSCULAR; INTRAVENOUS at 09:29

## 2018-12-05 RX ADMIN — SODIUM CHLORIDE 1000 ML: 0.9 INJECTION, SOLUTION INTRAVENOUS at 10:41

## 2018-12-05 RX ADMIN — SODIUM CHLORIDE 1000 ML: 0.9 INJECTION, SOLUTION INTRAVENOUS at 09:29

## 2018-12-05 NOTE — DISCHARGE INSTRUCTIONS
Acute Nausea and Vomiting   WHAT YOU NEED TO KNOW:   Acute nausea and vomiting start suddenly, worsen quickly, and last a short time  DISCHARGE INSTRUCTIONS:   Return to the emergency department if:   · You see blood in your vomit or your bowel movements  · You have sudden, severe pain in your chest and upper abdomen after hard vomiting or retching  · You have swelling in your neck and chest      · You are dizzy, cold, and thirsty and your eyes and mouth are dry  · You are urinating very little or not at all  · You have muscle weakness, leg cramps, and trouble breathing  · Your heart is beating much faster than normal      · You continue to vomit for more than 48 hours  Contact your healthcare provider if:   · You have frequent dry heaves (vomiting but nothing comes out)  · Your nausea and vomiting does not get better or go away after you use medicine  · You have questions or concerns about your condition or treatment  Medicines: You may need any of the following:  · Medicines  may be given to calm your stomach and stop your vomiting  You may also need medicines to help you feel more relaxed or to stop nausea and vomiting caused by motion sickness  · Gastrointestinal stimulants  are used to help empty your stomach and bowels  This may help decrease nausea and vomiting  · Take your medicine as directed  Contact your healthcare provider if you think your medicine is not helping or if you have side effects  Tell him or her if you are allergic to any medicine  Keep a list of the medicines, vitamins, and herbs you take  Include the amounts, and when and why you take them  Bring the list or the pill bottles to follow-up visits  Carry your medicine list with you in case of an emergency  Prevent or manage acute nausea and vomiting:   · Do not drink alcohol  Alcohol may upset or irritate your stomach  Too much alcohol can also cause acute nausea and vomiting  · Control stress    Headaches due to stress may cause nausea and vomiting  Find ways to relax and manage your stress  Get more rest and sleep  · Drink more liquids as directed  Vomiting can lead to dehydration  It is important to drink more liquids to help replace lost body fluids  Ask your healthcare provider how much liquid to drink each day and which liquids are best for you  Your provider may recommend that you drink an oral rehydration solution (ORS)  ORS contains water, salts, and sugar that are needed to replace the lost body fluids  Ask what kind of ORS to use, how much to drink, and where to get it  · Eat smaller meals, more often  Eat small amounts of food every 2 to 3 hours, even if you are not hungry  Food in your stomach may decrease your nausea  · Talk to your healthcare provider before you take over-the-counter (OTC) medicines  These medicines can cause serious problems if you use certain other medicines, or you have a medical condition  You may have problems if you use too much or use them for longer than the label says  Follow directions on the label carefully  Follow up with your healthcare provider as directed:  Write down your questions so you remember to ask them during your follow-up visits  © 2017 [address] [employer]  Information is for End User's use only and may not be sold, redistributed or otherwise used for commercial purposes  All illustrations and images included in CareNotes® are the copyrighted property of A [employer] , E96  or Matt Zapata  The above information is an  only  It is not intended as medical advice for individual conditions or treatments  Talk to your doctor, nurse or pharmacist before following any medical regimen to see if it is safe and effective for you  Abdominal Pain   WHAT YOU NEED TO KNOW:   Abdominal pain can be dull, achy, or sharp  You may have pain in one area of your abdomen, or in your entire abdomen   Your pain may be caused by a condition such as constipation, food sensitivity or poisoning, infection, or a blockage  Abdominal pain can also be from a hernia, appendicitis, or an ulcer  Liver, gallbladder, or kidney conditions can also cause abdominal pain  The cause of your abdominal pain may be unknown  DISCHARGE INSTRUCTIONS:   Return to the emergency department if:   · You have new chest pain or shortness of breath  · You have pulsing pain in your upper abdomen or lower back that suddenly becomes constant  · Your pain is in the right lower abdominal area and worsens with movement  · You have a fever over 100 4°F (38°C) or shaking chills  · You are vomiting and cannot keep food or liquids down  · Your pain does not improve or gets worse over the next 8 to 12 hours  · You see blood in your vomit or bowel movements, or they look black and tarry  · Your skin or the whites of your eyes turn yellow  · You are a woman and have a large amount of vaginal bleeding that is not your monthly period  Contact your healthcare provider if:   · You have pain in your lower back  · You are a man and have pain in your testicles  · You have pain when you urinate  · You have questions or concerns about your condition or care  Follow up with your healthcare provider within 24 hours or as directed:  Write down your questions so you remember to ask them during your visits  Medicines:   · Medicines  may be given to calm your stomach and prevent vomiting or to decrease pain  Ask how to take pain medicine safely  · Take your medicine as directed  Contact your healthcare provider if you think your medicine is not helping or if you have side effects  Tell him of her if you are allergic to any medicine  Keep a list of the medicines, vitamins, and herbs you take  Include the amounts, and when and why you take them  Bring the list or the pill bottles to follow-up visits  Carry your medicine list with you in case of an emergency    ©  95[facility] Information is for End User's use only and may not be sold, redistributed or otherwise used for commercial purposes  All illustrations and images included in CareNotes® are the copyrighted property of A D A M , Inc  or Matt Zapata  The above information is an  only  It is not intended as medical advice for individual conditions or treatments  Talk to your doctor, nurse or pharmacist before following any medical regimen to see if it is safe and effective for you  Abdominal Pain in Pregnancy   WHAT YOU NEED TO KNOW:   Abdominal pain during pregnancy is common  Some of the causes include heartburn, constipation, gas, false labor, and round ligament pain  Round ligament pain is caused by stretching of the ligaments that support your uterus  Abdominal pain may be caused by a health problem, such as a stomach virus or appendicitis (inflammation of the appendix)  The pain may also be caused by a problem with your pregnancy, such as a threatened miscarriage or  labor  DISCHARGE INSTRUCTIONS:   Follow up with your obstetrician within 3 days:  Write down your questions so you remember to ask them during your visits  Self-care:   · Rest may help to relieve round ligament pain  Ask your healthcare provider about other ways to relieve this pain, such as a supportive belt or pregnancy exercises  · Use a heating pad set to the lowest setting or a hot compress to apply heat to your abdomen  Do this for 20 to 30 minutes every 2 hours for as many days as directed  · Avoid quick changes in position or movements that cause pain  · Do not lie flat in bed or bend over if you have heartburn  Ask your obstetrician if you should make any changes to the foods you eat  Ask if you can take any medicines for heartburn  · Eat more fiber and drink more liquids to relieve constipation   Fiber is found in fruits, vegetables, and whole-grain foods, such as whole-wheat bread and cereals  Ask how much liquid to drink each day and which liquids are best for you  Contact your obstetrician if:  · You continue to have abdominal pain that cannot be relieved  · You have a fever  · You have questions or concerns about your condition or care  Return to the emergency department if:   · You have sudden, severe pain or cramps that are so bad that you cannot walk or talk  · You have a fast heartbeat, shortness of breath, and you feel lightheaded or faint  · You have vaginal bleeding or discharge  · You have nausea, vomiting, fever, and severe pain on your right side  © 2017 [address] [employer]  Information is for End User's use only and may not be sold, redistributed or otherwise used for commercial purposes  All illustrations and images included in CareNotes® are the copyrighted property of A D A M , Inc  or Matt Zapata  The above information is an  only  It is not intended as medical advice for individual conditions or treatments  Talk to your doctor, nurse or pharmacist before following any medical regimen to see if it is safe and effective for you  Gastroenteritis   WHAT YOU NEED TO KNOW:   Gastroenteritis, or stomach flu, is an infection of the stomach and intestines  DISCHARGE INSTRUCTIONS:   Call 911 for any of the following:   · You have trouble breathing or a very fast pulse  Return to the emergency department if:   · You see blood in your diarrhea  · You cannot stop vomiting  · You have not urinated for 12 hours  · You feel like you are going to faint  Contact your healthcare provider if:   · You have a fever  · You continue to vomit or have diarrhea, even after treatment  · You see worms in your diarrhea  · Your mouth or eyes are dry  You are not urinating as much or as often  · You have questions or concerns about your condition or care    Medicines:   · Medicines  may be given to stop vomiting or diarrhea, decrease abdominal cramps, or treat an infection  · Take your medicine as directed  Contact your healthcare provider if you think your medicine is not helping or if you have side effects  Tell him or her if you are allergic to any medicine  Keep a list of the medicines, vitamins, and herbs you take  Include the amounts, and when and why you take them  Bring the list or the pill bottles to follow-up visits  Carry your medicine list with you in case of an emergency  Manage your symptoms:   · Drink liquids as directed  Ask your healthcare provider how much liquid to drink each day, and which liquids are best for you  You may also need to drink an oral rehydration solution (ORS)  An ORS has the right amounts of sugar, salt, and minerals in water to replace body fluids  · Eat bland foods  When you feel hungry, begin eating soft, bland foods  Examples are bananas, clear soup, potatoes, and applesauce  Do not have dairy products, alcohol, sugary drinks, or drinks with caffeine until you feel better  · Rest as much as possible  Slowly start to do more each day when you begin to feel better  Prevent the spread of gastroenteritis:  Gastroenteritis can spread easily  Keep yourself, your family, and your surroundings clean to help prevent the spread of gastroenteritis:  · Wash your hands often  Use soap and water  Wash your hands after you use the bathroom, change a child's diapers, or sneeze  Wash your hands before you prepare or eat food  · Clean surfaces and do laundry often  Wash your clothes and towels separately from the rest of the laundry  Clean surfaces in your home with antibacterial  or bleach  · Clean food thoroughly and cook safely  Wash raw vegetables before you cook  Cook meat, fish, and eggs fully  Do not use the same dishes for raw meat as you do for other foods  Refrigerate any leftover food immediately  · Be aware when you camp or travel  Drink only clean water  Do not drink from rivers or lakes unless you purify or boil the water first  When you travel, drink bottled water and do not add ice  Do not eat fruit that has not been peeled  Do not eat raw fish or meat that is not fully cooked  Follow up with your healthcare provider as directed:  Write down your questions so you remember to ask them during your visits  © 2017 [address] [employer]  Information is for End User's use only and may not be sold, redistributed or otherwise used for commercial purposes  All illustrations and images included in CareNotes® are the copyrighted property of A [employer] , [employer]  or Matt Zapata  The above information is an  only  It is not intended as medical advice for individual conditions or treatments  Talk to your doctor, nurse or pharmacist before following any medical regimen to see if it is safe and effective for you

## 2018-12-05 NOTE — PROGRESS NOTES
L&D Triage Note - OB/GYN  Alina Short 26 y o  female MRN: [id number]  Unit/Bed#: L&D 327-01 Encounter: [id number]    Patient is seen by   _________________________________________________________  ASSESSMENT:  Viral Gastroenteritis  _________________________________________________________  Alina Short is a 26 y o   at 23w11d  _________________________________________________________  PLAN:  _________________________________________________________  1) Supportive care, clear liquid diet, encouraged hydration  2) Continue routine prenatal care  3) Discharge from [facility] triage     - Case discussed with Dr Terral Essex  _________________________________________________________  SUBJECTIVE:     _________________________________________________________  Alina Short 26 y o   at 20w6d with an Estimated Date of Delivery: 19   Presented to ER for nausea and vomiting for 24 hours, reporting 6 episodes, denies any coffee grinds  Was given 2L bolus and Zofran which she tolerated well  Currently has no complaints  Admits to a sick contact, her mother who had similar symptoms  Denies any headache, chest pain, SOB, tremors, fever      Her current obstetrical history is significant for N/A    Her past obstetrical history is significant for N/A    Contractions: Denied  Leakage of fluid: Denied  Vaginal Bleeding: Denied  Fetal movement: present  _________________________________________________________  OBJECTIVE:  _________________________________________________________  Vitals:    18 1202   BP:    Pulse:    Resp: 18   Temp: 99 1 °F (37 3 °C)   SpO2:        ROS:  Constitutional: Negative  Respiratory: no SOB  Cardiovascular: Denies chest pain  Gastrointestinal: + Nausea vomiting and diarrhea    General Physical Exam:  General: in no apparent distress and well developed and well nourished  Cardiovascular: No murmurs  Lungs: non-labored breathing  Abdomen: abdomen is soft without significant tenderness, masses, organomegaly or guarding  Lower extremeties: nontender      Fetal monitoring:  FHR- 151      Urine Dip    + Ketones               Specific [place] 1 025        Yaquelin Simeon MD  Family Medicine PGY 1   12/5/2018 2:33 PM

## 2018-12-05 NOTE — ED PROVIDER NOTES
History  Chief Complaint   Patient presents with    Vomiting During Pregnancy     pt c/o vomiting since last night unable to keep anything down  last vomiting episode 1hr ago  pt also c/o cramping to abdomen  pt also states some watery/yellow discharge no bleeding  pt is 5 months pregnant        History provided by:  Patient   used: No    Medical Problem   Location:  Patient is 20 weeks pregnant is complaining of nausea vomiting and diarrhea  Severity:  Moderate  Onset quality:  Sudden  Duration:  1 day  Timing:  Constant  Progression:  Unchanged  Chronicity:  New  Relieved by:  Nothing  Worsened by:  Eating or drinking  Ineffective treatments:  None tried  Associated symptoms: abdominal pain, diarrhea, nausea and vomiting    Associated symptoms: no chest pain, no congestion, no cough, no fever, no headaches, no shortness of breath and no sore throat        Prior to Admission Medications   Prescriptions Last Dose Informant Patient Reported? Taking? IRON PO Not Taking at Unknown time  Yes No   Sig: Take 1 tablet by mouth daily   acetaminophen (TYLENOL) 500 mg tablet Not Taking at Unknown time  No No   Sig: Take 1 tablet (500 mg total) by mouth every 6 (six) hours as needed for mild pain, moderate pain, headaches or fever   Patient not taking: Reported on 11/28/2018       Facility-Administered Medications: None       Past Medical History:   Diagnosis Date    Herpes simplex        History reviewed  No pertinent surgical history  Family History   Problem Relation Age of Onset    Fibromyalgia Mother     Asthma Mother     Irritable bowel syndrome Mother     Depression Father     No Known Problems Sister     No Known Problems Brother     No Known Problems Son     Diabetes Maternal Grandmother     Cancer Maternal Grandmother     No Known Problems Paternal Grandmother     No Known Problems Paternal Grandfather      I have reviewed and agree with the history as documented      Social History   Substance Use Topics    Smoking status: Never Smoker    Smokeless tobacco: Never Used    Alcohol use No        Review of Systems   Constitutional: Positive for appetite change and chills  Negative for fever  HENT: Negative for congestion and sore throat  Respiratory: Negative for cough, chest tightness and shortness of breath  Cardiovascular: Negative for chest pain and leg swelling  Gastrointestinal: Positive for abdominal pain, diarrhea, nausea and vomiting  Genitourinary: Positive for decreased urine volume and vaginal discharge  Negative for dysuria, flank pain and vaginal bleeding  Musculoskeletal: Positive for back pain  Has chronic back pain since her epidural for her last baby over a year ago  Neurological: Negative for headaches  All other systems reviewed and are negative  Physical Exam  Physical Exam   Constitutional: She appears well-developed and well-nourished  She is cooperative  Non-toxic appearance  She does not have a sickly appearance  She does not appear ill  No distress  HENT:   Head: Normocephalic and atraumatic  Right Ear: Hearing normal  No drainage or swelling  Left Ear: Hearing normal  No drainage or swelling  Mouth/Throat: Mucous membranes are dry  Eyes: Conjunctivae and lids are normal  Right eye exhibits no discharge  Left eye exhibits no discharge  Neck: Trachea normal and normal range of motion  No JVD present  Cardiovascular: Regular rhythm, normal heart sounds, intact distal pulses and normal pulses  Tachycardia present  Exam reveals no gallop and no friction rub  No murmur heard  Pulmonary/Chest: Effort normal and breath sounds normal  No stridor  No respiratory distress  She has no wheezes  She has no rales  Abdominal: Soft  Normal appearance and bowel sounds are normal  She exhibits no distension, no ascites and no mass  There is no hepatosplenomegaly  There is generalized tenderness   There is no rebound, no guarding and no CVA tenderness  Very mild discomfort  No peritoneal signs  Musculoskeletal: Normal range of motion  She exhibits no edema or tenderness  Lymphadenopathy:        Right: No inguinal adenopathy present  Left: No inguinal adenopathy present  Neurological: She is alert  She has normal strength  She exhibits normal muscle tone  Gait normal  GCS eye subscore is 4  GCS verbal subscore is 5  GCS motor subscore is 6  Skin: Skin is warm, dry and intact  No rash noted  She is not diaphoretic  No pallor  Psychiatric: She has a normal mood and affect  Her speech is normal  Cognition and memory are normal    Nursing note and vitals reviewed  Vital Signs  ED Triage Vitals [12/05/18 0852]   Temperature Pulse Respirations Blood Pressure SpO2   97 8 °F (36 6 °C) 105 16 110/54 99 %      Temp Source Heart Rate Source Patient Position - Orthostatic VS BP Location FiO2 (%)   Temporal Monitor Sitting Right arm --      Pain Score       6           Vitals:    12/05/18 0852 12/05/18 1028   BP: 110/54 104/58   Pulse: 105 101   Patient Position - Orthostatic VS: Sitting        Visual Acuity      ED Medications  Medications   sodium chloride 0 9 % bolus 1,000 mL (0 mL Intravenous Stopped 12/5/18 1016)   ondansetron (ZOFRAN) injection 4 mg (4 mg Intravenous Given 12/5/18 0929)   sodium chloride 0 9 % bolus 1,000 mL (1,000 mL Intravenous New Bag 12/5/18 1041)       Diagnostic Studies  Results Reviewed     Procedure Component Value Units Date/Time    Urine Microscopic [[id number]]  (Abnormal) Collected:  12/05/18 1038    Lab Status:  Final result Specimen:  Urine from Urine, Other Updated:  12/05/18 1127     RBC, UA None Seen /hpf      WBC, UA 2-4 (A) /hpf      Epithelial Cells Moderate (A) /hpf      Bacteria, UA Innumerable (A) /hpf     Urine culture [[id number]] Collected:  12/05/18 1038    Lab Status:   In process Specimen:  Urine from Urine, Other Updated:  12/05/18 1029    POCT urinalysis dipstick [[id number]]  (Abnormal) Resulted:  12/05/18 1026    Lab Status:  Final result Specimen:  Urine Updated:  12/05/18 1026    ED Urine Macroscopic [[id number]]  (Abnormal) Collected:  12/05/18 1038    Lab Status:  Final result Specimen:  Urine Updated:  12/05/18 1024     Color, UA Roxann     Clarity, UA Clear     pH, UA 6 5     Leukocytes, UA Trace (A)     Nitrite, UA Negative     Protein, UA 30 (1+) (A) mg/dl      Glucose, UA Negative mg/dl      Ketones, UA >=160 (4+) (A) mg/dl      Urobilinogen, UA 0 2 E U /dl      Bilirubin, UA Interference- unable to analyze (A)     Blood, UA Negative     Specific Gravity, UA 1 025    Narrative:       CLINITEK RESULT    CBC and differential [[id number]]  (Abnormal) Collected:  12/05/18 0929    Lab Status:  Final result Specimen:  Blood from Arm, Left Updated:  12/05/18 1015     WBC 6 70 Thousand/uL      RBC 3 87 Million/uL      Hemoglobin 10 8 (L) g/dL      Hematocrit 34 2 (L) %      MCV 88 fL      MCH 27 9 pg      MCHC 31 6 g/dL      RDW 13 7 %      MPV 9 4 fL      Platelets 836 Thousands/uL      nRBC 0 /100 WBCs     Narrative: This is an appended report  These results have been appended to a previously verified report  Comprehensive metabolic panel [[id number]]  (Abnormal) Collected:  12/05/18 0929    Lab Status:  Final result Specimen:  Blood from Arm, Left Updated:  12/05/18 1005     Sodium 137 mmol/L      Potassium 4 0 mmol/L      Chloride 103 mmol/L      CO2 25 mmol/L      ANION GAP 9 mmol/L      BUN 8 mg/dL      Creatinine 0 61 mg/dL      Glucose 93 mg/dL      Calcium 8 0 (L) mg/dL      AST 14 U/L      ALT 11 (L) U/L      Alkaline Phosphatase 66 U/L      Total Protein 6 6 g/dL      Albumin 2 8 (L) g/dL      Total Bilirubin 0 42 mg/dL      eGFR 129 ml/min/1 73sq m     Narrative:         National Kidney Disease Education Program recommendations are as follows:  GFR calculation is accurate only with a steady state creatinine  Chronic Kidney disease less than 60 ml/min/1 73 sq  meters  Kidney failure less than 15 ml/min/1 73 sq  meters  Lipase [[id number]]  (Normal) Collected:  12/05/18 0929    Lab Status:  Final result Specimen:  Blood from Arm, Left Updated:  12/05/18 1005     Lipase 142 u/L     Magnesium [[id number]]  (Normal) Collected:  12/05/18 0929    Lab Status:  Final result Specimen:  Blood from Arm, Left Updated:  12/05/18 1005     Magnesium 1 7 mg/dL                  No orders to display              Procedures  Procedures       Phone Contacts  ED Phone Contact    ED Course                               MDM  Number of Diagnoses or Management Options  Abdominal pain:   Acute gastroenteritis:   Pregnancy:   Diagnosis management comments: Labs reviewed  No serious major abnormalities other than in her urine she had 4+ ketones  She was given 2 L of fluid and feels better  I did discuss the case with OBGYN because of her diffuse abdominal discomfort would sent up to L and D for some monitoring  Amount and/or Complexity of Data Reviewed  Clinical lab tests: reviewed and ordered  Discuss the patient with other providers: yes    Patient Progress  Patient progress: stable    CritCare Time    Disposition  Final diagnoses:   Acute gastroenteritis   Pregnancy   Abdominal pain     Time reflects when diagnosis was documented in both MDM as applicable and the Disposition within this note     Time User Action Codes Description Comment    12/5/2018 11:29 AM [place] Add [K52 9] Acute gastroenteritis     12/5/2018 11:29 AM [place] Add [Z34 90] Pregnancy     12/5/2018 11:29 AM [place] Add [R10 9] Abdominal pain       ED Disposition     ED Disposition Condition Comment    Discharge  [address] discharge to home/self care      Condition at discharge: Good        Follow-up Information     Follow up With Specialties Details Why [address] [place] [address] Obstetrics and Gynecology Schedule an appointment as soon as possible for a visit  Mona Ansari [id number]  [place], [address] [place] [address], [facility] [place], [phone]          Current Discharge Medication List      START taking these medications    Details   dicyclomine (BENTYL) 20 mg tablet Take 1 tablet (20 mg total) by mouth every 6 (six) hours as needed (abd pain) for up to 12 doses  Qty: 12 tablet, Refills: 0    Associated Diagnoses: Acute gastroenteritis      ondansetron (ZOFRAN) 4 mg tablet Take 1 tablet (4 mg total) by mouth every 6 (six) hours  Qty: 12 tablet, Refills: 0    Associated Diagnoses: Acute gastroenteritis         CONTINUE these medications which have NOT CHANGED    Details   acetaminophen (TYLENOL) 500 mg tablet Take 1 tablet (500 mg total) by mouth every 6 (six) hours as needed for mild pain, moderate pain, headaches or fever  Qty: 30 tablet, Refills: 0    Associated Diagnoses: Pelvic pain during pregnancy      IRON PO Take 1 tablet by mouth daily           No discharge procedures on file      ED Provider  Electronically Signed by           Bridgett Jacques MD  12/05/18 3722

## 2018-12-05 NOTE — ED NOTES
Patient reports improving nausea, no vomiting  16 oz water provided, crackers provided-patient tolerating intake,       Turner Dakin, RN  12/05/18 8431

## 2018-12-05 NOTE — ED NOTES
Called OB pt to be seen here first and then call to be made to [facility] to see if she needs to be evaluated by [facility] doctor        Em Cates RN  12/05/18 2814

## 2018-12-06 ENCOUNTER — ROUTINE PRENATAL (OUTPATIENT)
Dept: OBGYN CLINIC | Facility: CLINIC | Age: 23
End: 2018-12-06
Payer: COMMERCIAL

## 2018-12-06 VITALS — SYSTOLIC BLOOD PRESSURE: 94 MMHG | WEIGHT: 134.8 LBS | BODY MASS INDEX: 23.88 KG/M2 | DIASTOLIC BLOOD PRESSURE: 90 MMHG

## 2018-12-06 DIAGNOSIS — Z3A.21 21 WEEKS GESTATION OF PREGNANCY: ICD-10-CM

## 2018-12-06 DIAGNOSIS — Z34.92 PRENATAL CARE IN SECOND TRIMESTER: Primary | ICD-10-CM

## 2018-12-06 DIAGNOSIS — B00.9 HSV INFECTION: ICD-10-CM

## 2018-12-06 LAB — BACTERIA UR CULT: NORMAL

## 2018-12-06 PROCEDURE — G0145 SCR C/V CYTO,THINLAYER,RESCR: HCPCS | Performed by: OBSTETRICS & GYNECOLOGY

## 2018-12-06 PROCEDURE — 87491 CHLMYD TRACH DNA AMP PROBE: CPT | Performed by: OBSTETRICS & GYNECOLOGY

## 2018-12-06 PROCEDURE — 99213 OFFICE O/P EST LOW 20 MIN: CPT | Performed by: OBSTETRICS & GYNECOLOGY

## 2018-12-06 PROCEDURE — 87591 N.GONORRHOEAE DNA AMP PROB: CPT | Performed by: OBSTETRICS & GYNECOLOGY

## 2018-12-06 NOTE — PROGRESS NOTES
OB/GYN  PRENATAL H&P VISIT  Izzy Ray  2018  1:40 PM      SUBJECTIVE  Patient is here for initial prenatal H&P  This is an unintended pregnancy but desired  Patient is currently doing well  She was seen in the ED and triage yesterday for nausea/vomiting and diarrhea, likely 2/2 viral gastroenteritis  She received phenergan and IVF  She reports feeling much better today, denies any current nausea or episodes of vomiting since yesterday  She is here by herself  Previous uncomplicated pregnancy and  in 2017  She currently works as a   She has a support system at home, lives with her son and BF  She has hx of STD/STI, diagnosed with HSV  Has had outbreaks in the past but denies any current symptoms  Denies a hx of TB or close contacts with persons with TB  She denies a family history of inheritable conditions such as physical or intellectual disabilities, birth defects, blood disorders, heart or neural tube defects  She denies recent travel or travel planned in the near future  She denies use of nicotine or recreational drug use  She denies vaginal bleeding, cramping, leakage, abnormal discharge  Review of Systems   Constitutional: Positive for fatigue  Respiratory: Negative for shortness of breath  Gastrointestinal: Negative for abdominal pain  Genitourinary: Positive for vaginal discharge  Negative for vaginal bleeding and vaginal pain  Neurological: Negative for headaches  Past Medical History:   Diagnosis Date    Herpes simplex        No past surgical history on file  Social History     Social History    Marital status: Single     Spouse name: N/A    Number of children: N/A    Years of education: N/A     Occupational History    Not on file       Social History Main Topics    Smoking status: Never Smoker    Smokeless tobacco: Never Used    Alcohol use No    Drug use: No    Sexual activity: Yes     Partners: Male     Birth control/ protection: None Other Topics Concern    Not on file     Social History Narrative    No narrative on file       OBJECTIVE  Vitals:    18 1336   BP: 94/90     Physical Exam   Constitutional: She is oriented to person, place, and time  She appears well-developed and well-nourished  No distress  HENT:   Head: Normocephalic and atraumatic  Cardiovascular: Normal rate and regular rhythm  Pulmonary/Chest: Effort normal and breath sounds normal    Abdominal:   Gravid, soft, non-tender   Neurological: She is alert and oriented to person, place, and time  Vulva: normal  Vagina: normal mucosa, normal discharge  Cervix: multiparous appearance, no cervical motion tenderness and no lesions    IMAGING:   TVUS 10/07/18: mean CRL 61mm = 12w4d GA  LABS:   CBC WNL, but patient needs to get prenatal panel drawn      ASSESSMENT AND PLAN    26 y o , , with BP 94/90   Wt 61 1 kg (134 lb 12 8 oz) , at Gestational Age: 20w0d here for her prenatal H&P       1  Pregnancy: H&P completed today  PN Labs reviewed today  Final dating via TVUS  TVUS 10/07/18: mean CRL 61mm = 12w4d GA  Labor expectations discussed with patient, including appointment schedule, nutrition, weight gain, exercise, medications, sexual intercourse, and nausea/vomiting  2  Screening: Pap smear collected  GC/CT collected  3  Consents: Delivery process including potential OVD and  reviewed  Consents signed today  4  Labor: For analgesia, patient plans on epidural   5  Postpartum: Patient plans on breastfeeding  Different methods of contraception were discussed with patient, including progesterone only oral pills, depo provera, nexplanon, mirena, and paragard  Undecided  6  Follow up: RTC in 4 weeks  Precautions regarding labor, leakage, bleeding, and fetal movement reviewed    7  HSV: to start valtrex suppression at 36w    D/w Dr Derrick Layne MD  2018  1:40 PM

## 2018-12-07 LAB
C TRACH DNA SPEC QL NAA+PROBE: NEGATIVE
N GONORRHOEA DNA SPEC QL NAA+PROBE: NEGATIVE

## 2018-12-12 ENCOUNTER — ROUTINE PRENATAL (OUTPATIENT)
Dept: PERINATAL CARE | Facility: CLINIC | Age: 23
End: 2018-12-12
Payer: COMMERCIAL

## 2018-12-12 ENCOUNTER — DOCUMENTATION (OUTPATIENT)
Dept: PERINATAL CARE | Facility: CLINIC | Age: 23
End: 2018-12-12

## 2018-12-12 VITALS
HEIGHT: 63 IN | BODY MASS INDEX: 23.92 KG/M2 | HEART RATE: 83 BPM | DIASTOLIC BLOOD PRESSURE: 61 MMHG | WEIGHT: 135 LBS | SYSTOLIC BLOOD PRESSURE: 97 MMHG

## 2018-12-12 DIAGNOSIS — Z67.91 RH NEGATIVE STATE IN ANTEPARTUM PERIOD, SECOND TRIMESTER: ICD-10-CM

## 2018-12-12 DIAGNOSIS — Z36.86 ENCOUNTER FOR ANTENATAL SCREENING FOR CERVICAL LENGTH: ICD-10-CM

## 2018-12-12 DIAGNOSIS — O26.892 RH NEGATIVE STATE IN ANTEPARTUM PERIOD, SECOND TRIMESTER: ICD-10-CM

## 2018-12-12 DIAGNOSIS — O35.8XX0: Primary | ICD-10-CM

## 2018-12-12 DIAGNOSIS — O09.92 HIGH-RISK PREGNANCY IN SECOND TRIMESTER: ICD-10-CM

## 2018-12-12 DIAGNOSIS — Z3A.21 21 WEEKS GESTATION OF PREGNANCY: ICD-10-CM

## 2018-12-12 PROBLEM — O35.08X0: Status: ACTIVE | Noted: 2018-12-12

## 2018-12-12 LAB
LAB AP GYN PRIMARY INTERPRETATION: NORMAL
Lab: NORMAL
PATH INTERP SPEC-IMP: NORMAL

## 2018-12-12 PROCEDURE — 76817 TRANSVAGINAL US OBSTETRIC: CPT | Performed by: OBSTETRICS & GYNECOLOGY

## 2018-12-12 PROCEDURE — 59000 AMNIOCENTESIS DIAGNOSTIC: CPT | Performed by: OBSTETRICS & GYNECOLOGY

## 2018-12-12 PROCEDURE — 76811 OB US DETAILED SNGL FETUS: CPT | Performed by: OBSTETRICS & GYNECOLOGY

## 2018-12-12 PROCEDURE — 76946 ECHO GUIDE FOR AMNIOCENTESIS: CPT | Performed by: OBSTETRICS & GYNECOLOGY

## 2018-12-12 PROCEDURE — 99244 OFF/OP CNSLTJ NEW/EST MOD 40: CPT | Performed by: OBSTETRICS & GYNECOLOGY

## 2018-12-12 RX ADMIN — HUMAN RHO(D) IMMUNE GLOBULIN 300 MCG: 300 INJECTION, SOLUTION INTRAMUSCULAR at 12:45

## 2018-12-12 NOTE — LETTER
December 12, 2018     Patient: Alina Payne   YOB: 1995   Date of Visit: 12/12/2018       To Whom it May Concern: Alina Payne is under my professional care  She was seen in my office on 12/12/2018  She may return to work on Friday December 14th but should be out of work until then       If you have any questions or concerns, please don't hesitate to call           Sincerely,          Evy Davis MD        CC: Alina Payne

## 2018-12-12 NOTE — PROGRESS NOTES
Amniocentesis performed under ultrasound guidance by Dr Karen Johns post time out with staff  Verbal and written post procedure amnio instructions given to pt and     All questions answered and pt receptive and verbalizes understanding  Rhogam im given post procedure also

## 2018-12-12 NOTE — PROGRESS NOTES
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 fetal [facility] for consideration of    Moses Gant MD [address] [place] [address]  Attending Physician, Kiran

## 2018-12-12 NOTE — PROGRESS NOTES
A transvaginal ultrasound was performed  Sonographer note on use of High Level Disinfection Process (Trophon) for transvaginal probe# 1 used, serial [id number]    Jose Antonio Au, RDMS

## 2018-12-13 ENCOUNTER — DOCUMENTATION (OUTPATIENT)
Dept: PERINATAL CARE | Facility: CLINIC | Age: 23
End: 2018-12-13

## 2018-12-13 NOTE — PROGRESS NOTES
Referral for MRI only sent to [facility], order and u/s report faxed to [phone]  Discussed referral with Batsheva Hannon at [facility] for MRI  Requested to expedite  Pt notified and aware [facility] appt  canceled and [facility] will contact her for an appt  Pt also aware DVD will be given to her before she leaves appt  DVD request written on order and verbalized to [facility]  Will followup with pt later this pm or am to confirm she was contacted by [facility]

## 2018-12-14 ENCOUNTER — DOCUMENTATION (OUTPATIENT)
Dept: PERINATAL CARE | Facility: CLINIC | Age: 23
End: 2018-12-14

## 2018-12-14 NOTE — PROGRESS NOTES
Telephone call received  from [facility] requesting pt prenatal records-spoke with [address] [facility] clinicals faxed to [phone]  [address] [facility] stated due to [place] state medicaid, pt insurance needs review with medical director for approval  Contacted pt for follow up to discuss if appt made with  U[place]   stated they are reviewing coverage for with regards to Dr Pauly Alexandra at [facility]  Pt states she awaiting a return call

## 2018-12-18 ENCOUNTER — DOCUMENTATION (OUTPATIENT)
Dept: PERINATAL CARE | Facility: CLINIC | Age: 23
End: 2018-12-18

## 2018-12-18 NOTE — PROGRESS NOTES
Received notification from [facility]-spoke with Kalin Wagner RN at fetal center stated [facility] cannot perform MRI due to insurance not covering testing  [facility] notified pt and updated her regarding above and appt  was canceled by [facility] notified by me and spoke with Neida Dill RN also aware at [facility])  pt appt canceled at [address] [place] [address] [place] they will attempt to get MRI at their facility when pt comes for evaluation on Wed 12/19  Message sent to Dr Kristine Moe through epic messaging as high priority regarding above

## 2018-12-28 LAB
ACHE AMN-CCNC: POSITIVE U/L
ACHE AMN-IMP: ABNORMAL
AFP ADJ MOM AMN: 4.7
AFP AMN-MCNC: 23.1 UG/ML
AFP AMN-MCNC: ABNORMAL UG/L
ARRAY TYPE: NORMAL
CELLS ANALYZED AMN: 50
CHROM ANALY INTERPHASE AMN FISH-IMP: NORMAL
CHROM ANALY INTERPHASE BLD FISH-IMP: NORMAL
CHROMOSOME BLD/T: NORMAL
FETAL HEMOGLOBIN TESTING: ABNORMAL
GA (WEEKS): 21 WK
GENOTYPING TARGETS: NORMAL
HGB F AMN QL: NORMAL
INTERPRETATION: ABNORMAL
KARYOTYP AMN: NORMAL
LAB DIRECTOR NAME PROVIDER: ABNORMAL
LAB DIRECTOR NAME PROVIDER: NORMAL
LAB DIRECTOR NAME PROVIDER: NORMAL
SPECIMEN SOURCE: NORMAL
SPECIMEN SOURCE: NORMAL
TOTAL CELLS COUNTED BLD: 50

## 2019-01-03 ENCOUNTER — TELEPHONE (OUTPATIENT)
Dept: PERINATAL CARE | Facility: CLINIC | Age: 24
End: 2019-01-03

## 2019-01-03 DIAGNOSIS — Z67.91 RH NEGATIVE STATE IN ANTEPARTUM PERIOD, SECOND TRIMESTER: ICD-10-CM

## 2019-01-03 DIAGNOSIS — B00.9 HSV INFECTION: ICD-10-CM

## 2019-01-03 DIAGNOSIS — O26.892 RH NEGATIVE STATE IN ANTEPARTUM PERIOD, SECOND TRIMESTER: ICD-10-CM

## 2019-01-03 DIAGNOSIS — O09.92 HIGH-RISK PREGNANCY IN SECOND TRIMESTER: ICD-10-CM

## 2019-01-03 DIAGNOSIS — O35.8XX0: ICD-10-CM

## 2019-01-04 ENCOUNTER — ROUTINE PRENATAL (OUTPATIENT)
Dept: PERINATAL CARE | Facility: CLINIC | Age: 24
End: 2019-01-04
Payer: COMMERCIAL

## 2019-01-04 VITALS
WEIGHT: 136 LBS | HEART RATE: 88 BPM | DIASTOLIC BLOOD PRESSURE: 63 MMHG | SYSTOLIC BLOOD PRESSURE: 91 MMHG | BODY MASS INDEX: 24.1 KG/M2 | HEIGHT: 63 IN

## 2019-01-04 DIAGNOSIS — Z3A.25 25 WEEKS GESTATION OF PREGNANCY: Primary | ICD-10-CM

## 2019-01-04 DIAGNOSIS — O35.8XX0: ICD-10-CM

## 2019-01-04 PROCEDURE — 96372 THER/PROPH/DIAG INJ SC/IM: CPT | Performed by: OBSTETRICS & GYNECOLOGY

## 2019-01-04 RX ORDER — BETAMETHASONE SODIUM PHOSPHATE AND BETAMETHASONE ACETATE 3; 3 MG/ML; MG/ML
12 INJECTION, SUSPENSION INTRA-ARTICULAR; INTRALESIONAL; INTRAMUSCULAR; SOFT TISSUE ONCE
Status: COMPLETED | OUTPATIENT
Start: 2019-01-04 | End: 2019-01-04

## 2019-01-04 RX ADMIN — BETAMETHASONE SODIUM PHOSPHATE AND BETAMETHASONE ACETATE 12 MG: 3; 3 INJECTION, SUSPENSION INTRA-ARTICULAR; INTRALESIONAL; INTRAMUSCULAR at 14:43

## 2019-01-17 ENCOUNTER — OB ABSTRACT (OUTPATIENT)
Dept: PERINATAL CARE | Facility: CLINIC | Age: 24
End: 2019-01-17

## 2019-01-17 ENCOUNTER — TELEPHONE (OUTPATIENT)
Dept: PERINATAL CARE | Facility: CLINIC | Age: 24
End: 2019-01-17

## 2019-01-17 DIAGNOSIS — Z87.728 HISTORY OF NEURAL TUBE DEFECT: Primary | ICD-10-CM

## 2019-01-17 DIAGNOSIS — Z67.91 RH NEGATIVE STATE IN ANTEPARTUM PERIOD, SECOND TRIMESTER: ICD-10-CM

## 2019-01-17 DIAGNOSIS — O09.92 HIGH-RISK PREGNANCY IN SECOND TRIMESTER: ICD-10-CM

## 2019-01-17 DIAGNOSIS — B00.9 HSV INFECTION: ICD-10-CM

## 2019-01-17 DIAGNOSIS — O35.8XX0: ICD-10-CM

## 2019-01-17 DIAGNOSIS — O26.892 RH NEGATIVE STATE IN ANTEPARTUM PERIOD, SECOND TRIMESTER: ICD-10-CM

## 2019-01-17 RX ORDER — FOLIC ACID 1 MG/1
4000 TABLET ORAL DAILY
Qty: 360 TABLET | Refills: 5 | Status: SHIPPED | OUTPATIENT
Start: 2019-01-17 | End: 2020-07-07 | Stop reason: ALTCHOICE

## 2019-01-17 NOTE — PROGRESS NOTES
Please see my telephone encounter from earlier today  Shortly after I spoke with Dr Nilay Horvath, she reported to me that the patient informed her that she was driving herself to [place]  I later got an update from Dr Nilay Horvath that approximately 5 min after arrival to [facility], the patient precipitously delivered, vaginally and that baby is in NICU  Will prescribe folic acid for postpartum    Lizeth Waddell MD

## 2019-01-17 NOTE — TELEPHONE ENCOUNTER
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

## 2019-04-30 ENCOUNTER — ULTRASOUND (OUTPATIENT)
Dept: OBGYN CLINIC | Facility: CLINIC | Age: 24
End: 2019-04-30

## 2019-04-30 VITALS
DIASTOLIC BLOOD PRESSURE: 63 MMHG | BODY MASS INDEX: 22.6 KG/M2 | SYSTOLIC BLOOD PRESSURE: 100 MMHG | WEIGHT: 127.6 LBS | HEART RATE: 84 BPM

## 2019-04-30 DIAGNOSIS — N91.2 AMENORRHEA: Primary | ICD-10-CM

## 2019-04-30 PROBLEM — O35.08X0: Status: RESOLVED | Noted: 2018-12-12 | Resolved: 2019-04-30

## 2019-04-30 PROBLEM — Z3A.21 21 WEEKS GESTATION OF PREGNANCY: Status: RESOLVED | Noted: 2018-12-12 | Resolved: 2019-04-30

## 2019-04-30 PROBLEM — B00.9 HSV INFECTION: Status: RESOLVED | Noted: 2018-12-06 | Resolved: 2019-04-30

## 2019-04-30 PROBLEM — Z67.91 RH NEGATIVE STATE IN ANTEPARTUM PERIOD, SECOND TRIMESTER: Status: RESOLVED | Noted: 2018-12-12 | Resolved: 2019-04-30

## 2019-04-30 PROBLEM — O26.892 RH NEGATIVE STATE IN ANTEPARTUM PERIOD, SECOND TRIMESTER: Status: RESOLVED | Noted: 2018-12-12 | Resolved: 2019-04-30

## 2019-04-30 PROBLEM — O09.92 HIGH-RISK PREGNANCY IN SECOND TRIMESTER: Status: RESOLVED | Noted: 2018-12-12 | Resolved: 2019-04-30

## 2019-04-30 PROBLEM — Z3A.08 8 WEEKS GESTATION OF PREGNANCY: Status: ACTIVE | Noted: 2019-04-30

## 2019-04-30 PROBLEM — K52.9 ACUTE GASTROENTERITIS: Status: RESOLVED | Noted: 2018-12-05 | Resolved: 2019-04-30

## 2019-04-30 PROBLEM — O26.891 RH NEGATIVE STATUS DURING PREGNANCY IN FIRST TRIMESTER: Status: ACTIVE | Noted: 2019-04-30

## 2019-04-30 PROBLEM — O35.8XX0: Status: RESOLVED | Noted: 2018-12-12 | Resolved: 2019-04-30

## 2019-04-30 PROBLEM — O35.00X9: Status: ACTIVE | Noted: 2019-04-30

## 2019-04-30 PROBLEM — B00.9 HSV INFECTION: Status: ACTIVE | Noted: 2018-12-06

## 2019-04-30 PROBLEM — O35.0XX9: Status: ACTIVE | Noted: 2019-04-30

## 2019-04-30 PROBLEM — Z67.91 RH NEGATIVE STATUS DURING PREGNANCY IN FIRST TRIMESTER: Status: ACTIVE | Noted: 2019-04-30

## 2019-04-30 PROCEDURE — 99213 OFFICE O/P EST LOW 20 MIN: CPT | Performed by: OBSTETRICS & GYNECOLOGY

## 2019-05-07 ENCOUNTER — INITIAL PRENATAL (OUTPATIENT)
Dept: OBGYN CLINIC | Facility: CLINIC | Age: 24
End: 2019-05-07

## 2019-05-07 DIAGNOSIS — Z34.91 PRENATAL CARE IN FIRST TRIMESTER: Primary | ICD-10-CM

## 2019-05-07 PROCEDURE — 99211 OFF/OP EST MAY X REQ PHY/QHP: CPT

## 2019-05-08 ENCOUNTER — ULTRASOUND (OUTPATIENT)
Dept: PERINATAL CARE | Facility: OTHER | Age: 24
End: 2019-05-08
Payer: COMMERCIAL

## 2019-05-08 VITALS
BODY MASS INDEX: 22.96 KG/M2 | SYSTOLIC BLOOD PRESSURE: 99 MMHG | WEIGHT: 129.6 LBS | HEART RATE: 81 BPM | DIASTOLIC BLOOD PRESSURE: 64 MMHG | HEIGHT: 63 IN

## 2019-05-08 DIAGNOSIS — Z3A.09 9 WEEKS GESTATION OF PREGNANCY: ICD-10-CM

## 2019-05-08 DIAGNOSIS — O09.891 SHORT INTERVAL BETWEEN PREGNANCIES COMPLICATING PREGNANCY, ANTEPARTUM, FIRST TRIMESTER: ICD-10-CM

## 2019-05-08 DIAGNOSIS — Z34.91 PRENATAL CARE IN FIRST TRIMESTER: ICD-10-CM

## 2019-05-08 DIAGNOSIS — O35.2XX0 PREVIOUS CHILD WITH CONGENITAL ANOMALY, CURRENTLY PREGNANT, ANTEPARTUM, SINGLE OR UNSPECIFIED FETUS: Primary | ICD-10-CM

## 2019-05-08 DIAGNOSIS — Z36.89 ENCOUNTER TO ESTABLISH GESTATIONAL AGE USING ULTRASOUND: ICD-10-CM

## 2019-05-08 PROCEDURE — 76801 OB US < 14 WKS SINGLE FETUS: CPT | Performed by: OBSTETRICS & GYNECOLOGY

## 2019-05-08 PROCEDURE — 99241 PR OFFICE CONSULTATION NEW/ESTAB PATIENT 15 MIN: CPT | Performed by: OBSTETRICS & GYNECOLOGY

## 2019-05-10 PROBLEM — N91.2 AMENORRHEA: Status: RESOLVED | Noted: 2019-04-30 | Resolved: 2019-05-10

## 2019-05-10 PROBLEM — O35.00X9: Status: RESOLVED | Noted: 2019-04-30 | Resolved: 2019-05-10

## 2019-05-10 PROBLEM — O09.891 SHORT INTERVAL BETWEEN PREGNANCIES COMPLICATING PREGNANCY, ANTEPARTUM, FIRST TRIMESTER: Status: ACTIVE | Noted: 2019-05-10

## 2019-05-10 PROBLEM — O35.0XX9: Status: RESOLVED | Noted: 2019-04-30 | Resolved: 2019-05-10

## 2019-05-10 PROBLEM — O35.2XX0 PREVIOUS CHILD WITH CONGENITAL ANOMALY, CURRENTLY PREGNANT, ANTEPARTUM: Status: ACTIVE | Noted: 2019-05-10

## 2019-05-14 ENCOUNTER — ROUTINE PRENATAL (OUTPATIENT)
Dept: OBGYN CLINIC | Facility: CLINIC | Age: 24
End: 2019-05-14

## 2019-05-14 VITALS
WEIGHT: 127.8 LBS | HEART RATE: 98 BPM | SYSTOLIC BLOOD PRESSURE: 113 MMHG | DIASTOLIC BLOOD PRESSURE: 76 MMHG | BODY MASS INDEX: 22.64 KG/M2

## 2019-05-14 DIAGNOSIS — B37.3 VAGINA, CANDIDIASIS: ICD-10-CM

## 2019-05-14 DIAGNOSIS — Z67.91 RH NEGATIVE STATUS DURING PREGNANCY IN FIRST TRIMESTER: ICD-10-CM

## 2019-05-14 DIAGNOSIS — B00.9 HSV INFECTION: ICD-10-CM

## 2019-05-14 DIAGNOSIS — O26.891 RH NEGATIVE STATUS DURING PREGNANCY IN FIRST TRIMESTER: ICD-10-CM

## 2019-05-14 DIAGNOSIS — Z34.91 PRENATAL CARE, FIRST TRIMESTER: ICD-10-CM

## 2019-05-14 DIAGNOSIS — Z3A.09 9 WEEKS GESTATION OF PREGNANCY: Primary | ICD-10-CM

## 2019-05-14 DIAGNOSIS — O09.891 SHORT INTERVAL BETWEEN PREGNANCIES COMPLICATING PREGNANCY, ANTEPARTUM, FIRST TRIMESTER: ICD-10-CM

## 2019-05-14 DIAGNOSIS — O35.2XX0 PREVIOUS CHILD WITH CONGENITAL ANOMALY, CURRENTLY PREGNANT, ANTEPARTUM, SINGLE OR UNSPECIFIED FETUS: ICD-10-CM

## 2019-05-14 PROCEDURE — 87210 SMEAR WET MOUNT SALINE/INK: CPT | Performed by: OBSTETRICS & GYNECOLOGY

## 2019-05-14 PROCEDURE — 87591 N.GONORRHOEAE DNA AMP PROB: CPT | Performed by: OBSTETRICS & GYNECOLOGY

## 2019-05-14 PROCEDURE — 99213 OFFICE O/P EST LOW 20 MIN: CPT | Performed by: OBSTETRICS & GYNECOLOGY

## 2019-05-14 PROCEDURE — 87491 CHLMYD TRACH DNA AMP PROBE: CPT | Performed by: OBSTETRICS & GYNECOLOGY

## 2019-05-16 LAB
C TRACH DNA SPEC QL NAA+PROBE: NEGATIVE
N GONORRHOEA DNA SPEC QL NAA+PROBE: NEGATIVE

## 2019-06-06 ENCOUNTER — ULTRASOUND (OUTPATIENT)
Dept: PERINATAL CARE | Facility: OTHER | Age: 24
End: 2019-06-06
Payer: COMMERCIAL

## 2019-06-06 VITALS
HEART RATE: 71 BPM | DIASTOLIC BLOOD PRESSURE: 64 MMHG | WEIGHT: 129.2 LBS | SYSTOLIC BLOOD PRESSURE: 99 MMHG | HEIGHT: 63 IN | BODY MASS INDEX: 22.89 KG/M2

## 2019-06-06 DIAGNOSIS — Z36.82 ENCOUNTER FOR NUCHAL TRANSLUCENCY TESTING: ICD-10-CM

## 2019-06-06 DIAGNOSIS — Z3A.13 13 WEEKS GESTATION OF PREGNANCY: ICD-10-CM

## 2019-06-06 DIAGNOSIS — O35.2XX0 PREVIOUS CHILD WITH CONGENITAL ANOMALY, CURRENTLY PREGNANT, ANTEPARTUM, SINGLE OR UNSPECIFIED FETUS: Primary | ICD-10-CM

## 2019-06-06 PROCEDURE — 76813 OB US NUCHAL MEAS 1 GEST: CPT | Performed by: OBSTETRICS & GYNECOLOGY

## 2019-06-06 PROCEDURE — 76801 OB US < 14 WKS SINGLE FETUS: CPT | Performed by: OBSTETRICS & GYNECOLOGY

## 2019-06-06 PROCEDURE — 99212 OFFICE O/P EST SF 10 MIN: CPT | Performed by: OBSTETRICS & GYNECOLOGY

## 2019-06-11 ENCOUNTER — ROUTINE PRENATAL (OUTPATIENT)
Dept: OBGYN CLINIC | Facility: CLINIC | Age: 24
End: 2019-06-11

## 2019-06-11 VITALS
BODY MASS INDEX: 22.85 KG/M2 | SYSTOLIC BLOOD PRESSURE: 99 MMHG | WEIGHT: 129 LBS | HEART RATE: 69 BPM | DIASTOLIC BLOOD PRESSURE: 65 MMHG

## 2019-06-11 DIAGNOSIS — O09.891 SHORT INTERVAL BETWEEN PREGNANCIES COMPLICATING PREGNANCY, ANTEPARTUM, FIRST TRIMESTER: ICD-10-CM

## 2019-06-11 DIAGNOSIS — B00.9 HSV INFECTION: Primary | ICD-10-CM

## 2019-06-11 DIAGNOSIS — O26.891 RH NEGATIVE STATUS DURING PREGNANCY IN FIRST TRIMESTER: ICD-10-CM

## 2019-06-11 DIAGNOSIS — Z67.91 RH NEGATIVE STATUS DURING PREGNANCY IN FIRST TRIMESTER: ICD-10-CM

## 2019-06-11 DIAGNOSIS — O35.2XX0 PREVIOUS CHILD WITH CONGENITAL ANOMALY, CURRENTLY PREGNANT, ANTEPARTUM, SINGLE OR UNSPECIFIED FETUS: ICD-10-CM

## 2019-06-11 PROBLEM — Z3A.14 14 WEEKS GESTATION OF PREGNANCY: Chronic | Status: ACTIVE | Noted: 2019-04-30

## 2019-06-11 PROCEDURE — 99213 OFFICE O/P EST LOW 20 MIN: CPT | Performed by: OBSTETRICS & GYNECOLOGY

## 2019-06-13 ENCOUNTER — TELEPHONE (OUTPATIENT)
Dept: PERINATAL CARE | Facility: CLINIC | Age: 24
End: 2019-06-13

## 2019-06-18 ENCOUNTER — OFFICE VISIT (OUTPATIENT)
Dept: URGENT CARE | Facility: MEDICAL CENTER | Age: 24
End: 2019-06-18
Payer: COMMERCIAL

## 2019-06-18 VITALS
SYSTOLIC BLOOD PRESSURE: 111 MMHG | DIASTOLIC BLOOD PRESSURE: 55 MMHG | RESPIRATION RATE: 16 BRPM | HEIGHT: 63 IN | BODY MASS INDEX: 22.86 KG/M2 | OXYGEN SATURATION: 100 % | WEIGHT: 129 LBS | HEART RATE: 90 BPM

## 2019-06-18 DIAGNOSIS — S05.02XA ABRASION OF LEFT CORNEA, INITIAL ENCOUNTER: Primary | ICD-10-CM

## 2019-06-18 DIAGNOSIS — H57.12 LEFT EYE PAIN: ICD-10-CM

## 2019-06-18 PROCEDURE — 99213 OFFICE O/P EST LOW 20 MIN: CPT | Performed by: FAMILY MEDICINE

## 2019-06-18 RX ORDER — POLYMYXIN B SULFATE AND TRIMETHOPRIM 1; 10000 MG/ML; [USP'U]/ML
1 SOLUTION OPHTHALMIC EVERY 6 HOURS
Qty: 2 ML | Refills: 0 | Status: SHIPPED | OUTPATIENT
Start: 2019-06-18 | End: 2019-06-25

## 2019-06-25 ENCOUNTER — TRANSCRIBE ORDERS (OUTPATIENT)
Dept: ADMINISTRATIVE | Facility: HOSPITAL | Age: 24
End: 2019-06-25

## 2019-06-25 ENCOUNTER — APPOINTMENT (OUTPATIENT)
Dept: LAB | Facility: HOSPITAL | Age: 24
End: 2019-06-25
Attending: OBSTETRICS & GYNECOLOGY
Payer: COMMERCIAL

## 2019-06-25 DIAGNOSIS — Z33.1 PREGNANT STATE, INCIDENTAL: ICD-10-CM

## 2019-06-25 DIAGNOSIS — Z36.9 UNSPECIFIED ANTENATAL SCREENING: ICD-10-CM

## 2019-06-25 DIAGNOSIS — Z33.1 PREGNANT STATE, INCIDENTAL: Primary | ICD-10-CM

## 2019-06-25 LAB
ABO GROUP BLD: NORMAL
BACTERIA UR QL AUTO: ABNORMAL /HPF
BASOPHILS # BLD AUTO: 0.03 THOUSANDS/ΜL (ref 0–0.1)
BASOPHILS NFR BLD AUTO: 1 % (ref 0–1)
BILIRUB UR QL STRIP: NEGATIVE
BLD GP AB SCN SERPL QL: NEGATIVE
CLARITY UR: ABNORMAL
COLOR UR: YELLOW
EOSINOPHIL # BLD AUTO: 0.04 THOUSAND/ΜL (ref 0–0.61)
EOSINOPHIL NFR BLD AUTO: 1 % (ref 0–6)
ERYTHROCYTE [DISTWIDTH] IN BLOOD BY AUTOMATED COUNT: 13.7 % (ref 11.6–15.1)
GLUCOSE UR STRIP-MCNC: NEGATIVE MG/DL
HCT VFR BLD AUTO: 36.1 % (ref 34.8–46.1)
HGB BLD-MCNC: 11.4 G/DL (ref 11.5–15.4)
HGB UR QL STRIP.AUTO: NEGATIVE
IMM GRANULOCYTES # BLD AUTO: 0.02 THOUSAND/UL (ref 0–0.2)
IMM GRANULOCYTES NFR BLD AUTO: 0 % (ref 0–2)
KETONES UR STRIP-MCNC: NEGATIVE MG/DL
LEUKOCYTE ESTERASE UR QL STRIP: ABNORMAL
LYMPHOCYTES # BLD AUTO: 1.26 THOUSANDS/ΜL (ref 0.6–4.47)
LYMPHOCYTES NFR BLD AUTO: 21 % (ref 14–44)
MCH RBC QN AUTO: 27.8 PG (ref 26.8–34.3)
MCHC RBC AUTO-ENTMCNC: 31.6 G/DL (ref 31.4–37.4)
MCV RBC AUTO: 88 FL (ref 82–98)
MONOCYTES # BLD AUTO: 0.31 THOUSAND/ΜL (ref 0.17–1.22)
MONOCYTES NFR BLD AUTO: 5 % (ref 4–12)
NEUTROPHILS # BLD AUTO: 4.3 THOUSANDS/ΜL (ref 1.85–7.62)
NEUTS SEG NFR BLD AUTO: 72 % (ref 43–75)
NITRITE UR QL STRIP: NEGATIVE
NON-SQ EPI CELLS URNS QL MICRO: ABNORMAL /HPF
NRBC BLD AUTO-RTO: 0 /100 WBCS
PH UR STRIP.AUTO: 7.5 [PH]
PLATELET # BLD AUTO: 238 THOUSANDS/UL (ref 149–390)
PMV BLD AUTO: 9.8 FL (ref 8.9–12.7)
PROT UR STRIP-MCNC: NEGATIVE MG/DL
RBC # BLD AUTO: 4.1 MILLION/UL (ref 3.81–5.12)
RBC #/AREA URNS AUTO: ABNORMAL /HPF
RH BLD: NEGATIVE
RUBV IGG SERPL IA-ACNC: 152 IU/ML
SP GR UR STRIP.AUTO: 1.02 (ref 1–1.03)
SPECIMEN EXPIRATION DATE: NORMAL
UROBILINOGEN UR QL STRIP.AUTO: 0.2 E.U./DL
WBC # BLD AUTO: 5.96 THOUSAND/UL (ref 4.31–10.16)
WBC #/AREA URNS AUTO: ABNORMAL /HPF

## 2019-06-25 PROCEDURE — 80081 OBSTETRIC PANEL INC HIV TSTG: CPT

## 2019-06-25 PROCEDURE — 87086 URINE CULTURE/COLONY COUNT: CPT

## 2019-06-25 PROCEDURE — 36415 COLL VENOUS BLD VENIPUNCTURE: CPT

## 2019-06-25 PROCEDURE — 81001 URINALYSIS AUTO W/SCOPE: CPT

## 2019-06-26 LAB
BACTERIA UR CULT: NORMAL
HBV SURFACE AG SER QL: NORMAL
HIV 1+2 AB+HIV1 P24 AG SERPL QL IA: NORMAL
RPR SER QL: NORMAL
SCAN RESULT: NORMAL

## 2019-06-28 ENCOUNTER — TELEPHONE (OUTPATIENT)
Dept: PERINATAL CARE | Facility: CLINIC | Age: 24
End: 2019-06-28

## 2019-07-09 ENCOUNTER — ROUTINE PRENATAL (OUTPATIENT)
Dept: OBGYN CLINIC | Facility: CLINIC | Age: 24
End: 2019-07-09

## 2019-07-09 VITALS
DIASTOLIC BLOOD PRESSURE: 62 MMHG | BODY MASS INDEX: 23.14 KG/M2 | WEIGHT: 130.6 LBS | SYSTOLIC BLOOD PRESSURE: 97 MMHG | HEIGHT: 63 IN

## 2019-07-09 DIAGNOSIS — Z3A.18 18 WEEKS GESTATION OF PREGNANCY: ICD-10-CM

## 2019-07-09 DIAGNOSIS — Z34.92 PRENATAL CARE IN SECOND TRIMESTER: Primary | ICD-10-CM

## 2019-07-09 PROCEDURE — 99214 OFFICE O/P EST MOD 30 MIN: CPT | Performed by: NURSE PRACTITIONER

## 2019-07-09 NOTE — PROGRESS NOTES
Assessment & Plan  22 y o  [id number] at 18w0d presenting for routine prenatal visit  Pt is considering a sterilization procedure, provided information on Mirena IUD verbally and in writing  All questions were answered  Explained permanence of sterilization and that papers could be signed at 28 weeks     Problem List Items Addressed This Visit        Other    18 weeks gestation of pregnancy    Prenatal care in second trimester - Primary        ____________________________________________________________  Subjective  She is without complaint  She denies contractions, loss of fluid, or vaginal bleeding  She does notfeels regular fetal movements      Objective  BP 97/62   Ht 5' 3" (1 6 m)   Wt 59 2 kg (130 lb 9 6 oz)   LMP 03/05/2019 (Exact Date)   BMI 23 13 kg/m²          Patient's Active Problem List  Patient Active Problem List   Diagnosis    HSV infection    18 weeks gestation of pregnancy    Rh negative status during pregnancy in first trimester    Previous child with congenital anomaly, currently pregnant, antepartum    Short interval between pregnancies complicating pregnancy, antepartum, first trimester    Prenatal care in second trimester     Plan  Keep appointment for Level Ii ultrasound  Call with vaginal bleeding, loss of fluid, regular contractions,  other needs or concerns  Return in 4 weeks  Pt verbalized understanding of all discussed

## 2019-07-09 NOTE — PATIENT INSTRUCTIONS
Keep appointment for Level Ii ultrasound  Call with vaginal bleeding, loss of fluid, regular contractions,  other needs or concerns    Return in 4 weeks

## 2019-08-05 ENCOUNTER — ROUTINE PRENATAL (OUTPATIENT)
Dept: PERINATAL CARE | Facility: OTHER | Age: 24
End: 2019-08-05
Payer: COMMERCIAL

## 2019-08-05 VITALS
WEIGHT: 137 LBS | SYSTOLIC BLOOD PRESSURE: 98 MMHG | DIASTOLIC BLOOD PRESSURE: 67 MMHG | HEART RATE: 70 BPM | BODY MASS INDEX: 24.27 KG/M2 | HEIGHT: 63 IN

## 2019-08-05 DIAGNOSIS — Z3A.21 21 WEEKS GESTATION OF PREGNANCY: ICD-10-CM

## 2019-08-05 DIAGNOSIS — O09.892 SHORT INTERVAL BETWEEN PREGNANCIES COMPLICATING PREGNANCY, ANTEPARTUM, SECOND TRIMESTER: ICD-10-CM

## 2019-08-05 DIAGNOSIS — O35.2XX0 PREVIOUS CHILD WITH CONGENITAL ANOMALY, CURRENTLY PREGNANT, ANTEPARTUM, SINGLE OR UNSPECIFIED FETUS: Primary | ICD-10-CM

## 2019-08-05 DIAGNOSIS — Z36.86 ENCOUNTER FOR ANTENATAL SCREENING FOR CERVICAL LENGTH: ICD-10-CM

## 2019-08-05 PROCEDURE — 76817 TRANSVAGINAL US OBSTETRIC: CPT | Performed by: OBSTETRICS & GYNECOLOGY

## 2019-08-05 PROCEDURE — 76811 OB US DETAILED SNGL FETUS: CPT | Performed by: OBSTETRICS & GYNECOLOGY

## 2019-08-05 PROCEDURE — 99212 OFFICE O/P EST SF 10 MIN: CPT | Performed by: OBSTETRICS & GYNECOLOGY

## 2019-08-05 NOTE — PROGRESS NOTES
Please refer to the [facility] ultrasound report in Ob Procedures for additional information regarding the visit to the [facility]  today

## 2019-08-05 NOTE — LETTER
August 5, 2019     [facility] [address] PROVIDER    Patient: Lari Nissen   YOB: 1995   Date of Visit: 8/5/2019       Dear   Provider: Thank you for referring Rivera Ravi to me for evaluation  Below are my notes for this consultation  If you have questions, please do not hesitate to call me  I look forward to following your patient along with you           Sincerely,        Sukhwinder Davis MD        CC: No Recipients

## 2019-08-06 ENCOUNTER — ROUTINE PRENATAL (OUTPATIENT)
Dept: OBGYN CLINIC | Facility: CLINIC | Age: 24
End: 2019-08-06

## 2019-08-06 VITALS — BODY MASS INDEX: 23.91 KG/M2 | DIASTOLIC BLOOD PRESSURE: 64 MMHG | WEIGHT: 135 LBS | SYSTOLIC BLOOD PRESSURE: 99 MMHG

## 2019-08-06 DIAGNOSIS — Z34.92 PRENATAL CARE IN SECOND TRIMESTER: ICD-10-CM

## 2019-08-06 DIAGNOSIS — B37.9 YEAST INFECTION: ICD-10-CM

## 2019-08-06 DIAGNOSIS — R39.9 UTI SYMPTOMS: Primary | ICD-10-CM

## 2019-08-06 DIAGNOSIS — Z3A.22 22 WEEKS GESTATION OF PREGNANCY: ICD-10-CM

## 2019-08-06 LAB
BV WHIFF TEST VAG QL: NEGATIVE
CLUE CELLS SPEC QL WET PREP: NEGATIVE
PH SMN: 4.5 [PH]
SL AMB  POCT GLUCOSE, UA: NEGATIVE
SL AMB LEUKOCYTE ESTERASE,UA: NORMAL
SL AMB POCT BILIRUBIN,UA: NEGATIVE
SL AMB POCT BLOOD,UA: NEGATIVE
SL AMB POCT CLARITY,UA: CLEAR
SL AMB POCT COLOR,UA: NORMAL
SL AMB POCT KETONES,UA: NEGATIVE
SL AMB POCT NITRITE,UA: NEGATIVE
SL AMB POCT PH,UA: 6.5
SL AMB POCT SPECIFIC GRAVITY,UA: 1.02
SL AMB POCT URINE PROTEIN: NORMAL
SL AMB POCT UROBILINOGEN: 0.2
SL AMB POCT WET MOUNT: ABNORMAL
T VAGINALIS RRNA SPEC QL NAA+PROBE: NEGATIVE
YEAST VAG QL WET PREP: POSITIVE

## 2019-08-06 PROCEDURE — 87210 SMEAR WET MOUNT SALINE/INK: CPT | Performed by: NURSE PRACTITIONER

## 2019-08-06 PROCEDURE — 99215 OFFICE O/P EST HI 40 MIN: CPT | Performed by: NURSE PRACTITIONER

## 2019-08-06 PROCEDURE — 81002 URINALYSIS NONAUTO W/O SCOPE: CPT | Performed by: NURSE PRACTITIONER

## 2019-08-06 NOTE — PATIENT INSTRUCTIONS
Use Monistat 7 as directed  Wear loose clothes and cotton underwear  Increase water intake   Call with vaginal bleeding, loss of fluid, regular contractions, decreased fetal movement, other needs or concerns    Return in 4 weeks

## 2019-08-06 NOTE — PROGRESS NOTES
Assessment & Plan  22 y o  [id number] at 22w0d presenting for routine prenatal visit  Pt C/O vaginal discharge and vulvar swelling for several day  C/O urine odor    Explained urine dip was negative    Physical Exam  Vulva slight erythema, negative lesions  Vagina positive erythema, positive white thick discharge  Cervix negative lesions, positive thick white discharge  Wet mount positive for yeast    Problem List Items Addressed This Visit        Other    Prenatal care in second trimester - Primary    22 weeks gestation of pregnancy        ____________________________________________________________  Subjective  She is without complaint  She denies contractions, loss of fluid, or vaginal bleeding  She feels regular fetal movements      Objective  BP 99/64   Wt 61 2 kg (135 lb)   LMP 03/05/2019 (Exact Date)   BMI 23 91 kg/m²          Patient's Active Problem List  Patient Active Problem List   Diagnosis    HSV infection    Previous child with congenital anomaly, currently pregnant, antepartum    Prenatal care in second trimester    22 weeks gestation of pregnancy     Plan  Use Monistat 7 as directed  Wear loose clothes and cotton underwear  Increase water intake   Call with vaginal bleeding, loss of fluid, regular contractions, decreased fetal movement, other needs or concerns  Return in 4 weeks  Pt verbalized understanding of all discussed

## 2019-09-06 ENCOUNTER — ROUTINE PRENATAL (OUTPATIENT)
Dept: OBGYN CLINIC | Facility: CLINIC | Age: 24
End: 2019-09-06

## 2019-09-06 VITALS — SYSTOLIC BLOOD PRESSURE: 102 MMHG | DIASTOLIC BLOOD PRESSURE: 68 MMHG | WEIGHT: 141.2 LBS | BODY MASS INDEX: 25.01 KG/M2

## 2019-09-06 DIAGNOSIS — B37.9 YEAST INFECTION: ICD-10-CM

## 2019-09-06 DIAGNOSIS — Z3A.26 26 WEEKS GESTATION OF PREGNANCY: Primary | ICD-10-CM

## 2019-09-06 DIAGNOSIS — Z34.92 PRENATAL CARE IN SECOND TRIMESTER: ICD-10-CM

## 2019-09-06 LAB
BV WHIFF TEST VAG QL: NEGATIVE
CLUE CELLS SPEC QL WET PREP: NEGATIVE
PH SMN: 4.5 [PH]
SL AMB POCT WET MOUNT: ABNORMAL
T VAGINALIS VAG QL WET PREP: NEGATIVE
YEAST VAG QL WET PREP: POSITIVE

## 2019-09-06 PROCEDURE — 87210 SMEAR WET MOUNT SALINE/INK: CPT | Performed by: NURSE PRACTITIONER

## 2019-09-06 PROCEDURE — 99214 OFFICE O/P EST MOD 30 MIN: CPT | Performed by: NURSE PRACTITIONER

## 2019-09-06 RX ORDER — FLUCONAZOLE 150 MG/1
TABLET ORAL
Qty: 3 TABLET | Refills: 1 | Status: SHIPPED | OUTPATIENT
Start: 2019-09-06 | End: 2019-09-13

## 2019-09-06 NOTE — PROGRESS NOTES
Assessment & Plan  22 y o  [id number] at 26w3d presenting for routine prenatal visit  Pt presents with C/O continues vaginal discharge and intermittent vulvar swelling  Pt states she tried 2 weeks of Monisat 7  Pt states she has never been diabetic in any of her pregnancies and has always had yeast infections  D/W Dr Daniel Merrill, ok to use Fluconazole day 1, day 4 and day 7  Safe and effective use provided to the patient  Discussed comfort measures  Given slip for 28 week labs    Physical exam  Alert and oriented  Denies pain  WNL respiratory effort  Vulva slight erythema, negative lesions  Vagina positive thick white discharge, positive erythema  Cervix negtive lesions, positive thick white discharge  Wet mount positive for yeast          Problem List Items Addressed This Visit        Other    Prenatal care in second trimester    26 weeks gestation of pregnancy - Primary    Relevant Orders    CBC and differential    RPR    Glucose, 1H PG    Yeast infection    Relevant Medications    fluconazole (DIFLUCAN) 150 mg tablet    Other Relevant Orders    POCT wet mount (Completed)        ____________________________________________________________  Subjective     She denies contractions, loss of fluid, or vaginal bleeding  She feels regular fetal movements      Physical Exam   Objective  /68   Wt 64 kg (141 lb 3 2 oz)   LMP 03/05/2019 (Exact Date)   BMI 25 01 kg/m²     Fetal Heart Rate: 150    Patient's Active Problem List  Patient Active Problem List   Diagnosis    HSV infection    Previous child with congenital anomaly, currently pregnant, antepartum    Prenatal care in second trimester    26 weeks gestation of pregnancy    Yeast infection       Patient Instructions   Take Fluconazole as directed  Complete blood work as directed  Call with vaginal bleeding, loss of fluid, regular contractions, decreased fetal movement, other needs or concerns    Return in 3 weeks    Pt verbalized understanding of all discussed

## 2019-09-06 NOTE — PATIENT INSTRUCTIONS
Take Fluconazole as directed  Complete blood work as directed  Call with vaginal bleeding, loss of fluid, regular contractions, decreased fetal movement, other needs or concerns    Return in 3 weeks

## 2019-09-27 ENCOUNTER — TRANSCRIBE ORDERS (OUTPATIENT)
Dept: ADMINISTRATIVE | Facility: HOSPITAL | Age: 24
End: 2019-09-27

## 2019-09-27 ENCOUNTER — ROUTINE PRENATAL (OUTPATIENT)
Dept: OBGYN CLINIC | Facility: CLINIC | Age: 24
End: 2019-09-27

## 2019-09-27 VITALS — WEIGHT: 143.6 LBS | BODY MASS INDEX: 25.44 KG/M2 | DIASTOLIC BLOOD PRESSURE: 68 MMHG | SYSTOLIC BLOOD PRESSURE: 110 MMHG

## 2019-09-27 DIAGNOSIS — Z34.92 PRENATAL CARE IN SECOND TRIMESTER: ICD-10-CM

## 2019-09-27 DIAGNOSIS — Z3A.29 29 WEEKS GESTATION OF PREGNANCY: Primary | ICD-10-CM

## 2019-09-27 PROBLEM — B37.9 YEAST INFECTION: Status: RESOLVED | Noted: 2019-08-06 | Resolved: 2019-09-27

## 2019-09-27 PROCEDURE — 99215 OFFICE O/P EST HI 40 MIN: CPT | Performed by: NURSE PRACTITIONER

## 2019-09-27 PROCEDURE — 90715 TDAP VACCINE 7 YRS/> IM: CPT

## 2019-09-27 PROCEDURE — 90686 IIV4 VACC NO PRSV 0.5 ML IM: CPT

## 2019-09-27 PROCEDURE — 90472 IMMUNIZATION ADMIN EACH ADD: CPT

## 2019-09-27 PROCEDURE — 90471 IMMUNIZATION ADMIN: CPT

## 2019-09-27 NOTE — PATIENT INSTRUCTIONS
Complete 28 week lab wok today  Return for Rhogam  Call with vaginal bleeding, loss of fluid, regular contractions, decreased fetal movement, other needs or concerns    Return in 2 weeks for prenatal visit

## 2019-09-27 NOTE — PROGRESS NOTES
Assessment & Plan  22 y o  [id number] at 29w3d presenting for routine prenatal visit  28 week education was provided, all questions were answered  Birth Plan was complete  Pt had TDAP and Flu shot today  Pt plans 28 week labs today and to return for Rhogam    Problem List Items Addressed This Visit        Other    Prenatal care in second trimester    29 weeks gestation of pregnancy - Primary    Relevant Orders    TDAP Vaccine greater than or equal to 6yo    influenza vaccine, [id number], quadrivalent, 0 5 mL, preservative-free, for adult and pediatric patients 6 mos+ (AFLURIA, FLUARIX, FLULAVAL, FLUZONE)        ____________________________________________________________  Subjective  She is without complaint  She denies contractions, loss of fluid, or vaginal bleeding  She feels regular fetal movements      Objective  /68   Wt 65 1 kg (143 lb 9 6 oz)   LMP 03/05/2019 (Exact Date)   BMI 25 44 kg/m²          Patient's Active Problem List  Patient Active Problem List   Diagnosis    HSV infection    Previous child with congenital anomaly, currently pregnant, antepartum    Prenatal care in second trimester    29 weeks gestation of pregnancy     Plan  Complete 28 week lab wok today  Return for Rhogam  Call with vaginal bleeding, loss of fluid, regular contractions, decreased fetal movement, other needs or concerns  Return in 2 weeks for prenatal visit   Pt verbalized understanding of all discussed

## 2019-09-30 ENCOUNTER — ULTRASOUND (OUTPATIENT)
Dept: PERINATAL CARE | Facility: OTHER | Age: 24
End: 2019-09-30
Payer: COMMERCIAL

## 2019-09-30 ENCOUNTER — CLINICAL SUPPORT (OUTPATIENT)
Dept: OBGYN CLINIC | Facility: CLINIC | Age: 24
End: 2019-09-30

## 2019-09-30 VITALS
HEIGHT: 63 IN | WEIGHT: 146 LBS | SYSTOLIC BLOOD PRESSURE: 104 MMHG | HEART RATE: 93 BPM | DIASTOLIC BLOOD PRESSURE: 69 MMHG | BODY MASS INDEX: 25.87 KG/M2

## 2019-09-30 DIAGNOSIS — O26.891 RH NEGATIVE STATUS DURING PREGNANCY IN FIRST TRIMESTER: Primary | ICD-10-CM

## 2019-09-30 DIAGNOSIS — Z67.91 RH NEGATIVE STATUS DURING PREGNANCY IN FIRST TRIMESTER: Primary | ICD-10-CM

## 2019-09-30 DIAGNOSIS — O09.893 SHORT INTERVAL BETWEEN PREGNANCIES COMPLICATING PREGNANCY, ANTEPARTUM, THIRD TRIMESTER: Primary | ICD-10-CM

## 2019-09-30 DIAGNOSIS — Z3A.29 29 WEEKS GESTATION OF PREGNANCY: ICD-10-CM

## 2019-09-30 PROCEDURE — 96372 THER/PROPH/DIAG INJ SC/IM: CPT

## 2019-09-30 PROCEDURE — 99212 OFFICE O/P EST SF 10 MIN: CPT | Performed by: OBSTETRICS & GYNECOLOGY

## 2019-09-30 PROCEDURE — 76816 OB US FOLLOW-UP PER FETUS: CPT | Performed by: OBSTETRICS & GYNECOLOGY

## 2019-09-30 NOTE — PATIENT INSTRUCTIONS
Kick Counts in Pregnancy   WHAT YOU NEED TO KNOW:   Kick counts measure how much your baby is moving in your womb  A kick from your baby can be felt as a twist, turn, swish, roll, or jab  It is common to feel your baby kicking at 26 to 28 weeks of pregnancy  You may feel your baby kick as early as 20 weeks of pregnancy  DISCHARGE INSTRUCTIONS:   Return to the emergency department if:   · You feel your baby kick less as the day goes on      · You do not feel any kicks in a day  Contact your healthcare provider if:   · You feel a change in the number of kicks or movements of your baby  · You feel fewer than 10 kicks within 2 hours after counting twice  · You have questions or concerns about your baby's movements  Why measure kick counts:  Your baby's movement may provide information about your baby's health  He may move less, or not at all, if there are problems  He may move less if he does not have enough room to grow in your uterus (womb)  He may also move less if he is not getting enough oxygen or nutrition from the placenta  Tell your healthcare provider as soon as you feel a change in your baby's movements  Problems that are found earlier are easier to treat  When to measure kick counts:   · Measure kick counts at the same time every day  · Measure kick counts when your baby is awake and most active  Your baby may be most active in the evening  · Measure kick counts after a meal or snack  Your baby may be more active after you eat  Wait 2 hours after you drink liquids that contain caffeine  Caffeine can make your baby more active than usual     · You should not smoke while you are pregnant  Smoking increases the risk of health problems for you and for your baby during your pregnancy  If you do smoke, wait 2 hours to measure kick counts  Nicotine can make your baby more active than usual   How to measure kick counts:  Check that your baby is awake before you measure kick counts   You can wake up your baby by lightly pushing on your belly, walking, or drinking something cold  Your healthcare provider may tell you different ways to measure kick counts  He may tell you to do the following:  · Use a chart or clock to keep track of the time you start and finish counting  · Sit in a chair or lie on your left side  · Place your hands on the largest part of your belly  · Count until you reach 10 kicks  Write down how much time it takes to count 10 kicks  · It may take 30 minutes to 2 hours to count 10 kicks  It should not take more than 2 hours to count 10 kicks  · If you do not feel 10 kicks within 2 hours, wait 1 hour and count again  Your baby can sleep for up to 40 minutes at one time  Follow up with your healthcare provider as directed:  Write down your questions so you remember to ask them during your visits  © 2017 [employer] Information is for End User's use only and may not be sold, redistributed or otherwise used for commercial purposes  All illustrations and images included in CareNotes® are the copyrighted property of [employer]  or Matt Zapata  The above information is an  only  It is not intended as medical advice for individual conditions or treatments  Talk to your doctor, nurse or pharmacist before following any medical regimen to see if it is safe and effective for you

## 2019-09-30 NOTE — PROGRESS NOTES
Please refer to the [facility] ultrasound report in Ob Procedures for additional information regarding the visit to the [facility], [facility]  today

## 2019-09-30 NOTE — LETTER
September 30, 2019     [facility] PROVIDER    Patient: Hanna Danielson   YOB: 1995   Date of Visit: 9/30/2019       Dear   Provider: Thank you for referring Dewey Nails to me for evaluation  Below are my notes for this consultation  If you have questions, please do not hesitate to call me  I look forward to following your patient along with you  Sincerely,        Adam Simmons MD        CC: No Recipients  Adam Simmons MD  9/30/2019 11:17 AM  Sign at close encounter  Please refer to the [facility] ultrasound report in Ob Procedures for additional information regarding the visit to the [facility], [facility]  today

## 2019-10-01 DIAGNOSIS — O09.893 SHORT INTERVAL BETWEEN PREGNANCIES COMPLICATING PREGNANCY, ANTEPARTUM, THIRD TRIMESTER: ICD-10-CM

## 2019-10-01 DIAGNOSIS — O99.013 ANEMIA DURING PREGNANCY IN THIRD TRIMESTER: ICD-10-CM

## 2019-10-01 DIAGNOSIS — B00.9 HSV INFECTION: Primary | ICD-10-CM

## 2019-10-01 PROBLEM — Z34.93 PRENATAL CARE IN THIRD TRIMESTER: Status: ACTIVE | Noted: 2019-07-09

## 2019-10-01 PROBLEM — Z3A.30 30 WEEKS GESTATION OF PREGNANCY: Status: ACTIVE | Noted: 2019-08-06

## 2019-10-01 LAB
BASOPHILS # BLD AUTO: 23 CELLS/UL (ref 0–200)
BASOPHILS NFR BLD AUTO: 0.3 %
EOSINOPHIL # BLD AUTO: 23 CELLS/UL (ref 15–500)
EOSINOPHIL NFR BLD AUTO: 0.3 %
ERYTHROCYTE [DISTWIDTH] IN BLOOD BY AUTOMATED COUNT: 13.2 % (ref 11–15)
GLUCOSE 1H P 50 G GLC PO SERPL-MCNC: 112 MG/DL
HCT VFR BLD AUTO: 27.6 % (ref 35–45)
HGB BLD-MCNC: 8.7 G/DL (ref 11.7–15.5)
LYMPHOCYTES # BLD AUTO: 1245 CELLS/UL (ref 850–3900)
LYMPHOCYTES NFR BLD AUTO: 16.6 %
MCH RBC QN AUTO: 26 PG (ref 27–33)
MCHC RBC AUTO-ENTMCNC: 31.5 G/DL (ref 32–36)
MCV RBC AUTO: 82.4 FL (ref 80–100)
MONOCYTES # BLD AUTO: 390 CELLS/UL (ref 200–950)
MONOCYTES NFR BLD AUTO: 5.2 %
NEUTROPHILS # BLD AUTO: 5820 CELLS/UL (ref 1500–7800)
NEUTROPHILS NFR BLD AUTO: 77.6 %
PLATELET # BLD AUTO: 216 THOUSAND/UL (ref 140–400)
PMV BLD REES-ECKER: 10.3 FL (ref 7.5–12.5)
RBC # BLD AUTO: 3.35 MILLION/UL (ref 3.8–5.1)
RPR SER QL: NORMAL
WBC # BLD AUTO: 7.5 THOUSAND/UL (ref 3.8–10.8)

## 2019-10-01 RX ORDER — FERROUS SULFATE TAB EC 324 MG (65 MG FE EQUIVALENT) 324 (65 FE) MG
324 TABLET DELAYED RESPONSE ORAL
Qty: 60 TABLET | Refills: 3 | Status: SHIPPED | OUTPATIENT
Start: 2019-10-01 | End: 2020-07-07 | Stop reason: ALTCHOICE

## 2019-10-01 RX ORDER — DOCUSATE SODIUM 100 MG/1
100 CAPSULE, LIQUID FILLED ORAL 2 TIMES DAILY
Qty: 10 CAPSULE | Refills: 0 | Status: ON HOLD | OUTPATIENT
Start: 2019-10-01 | End: 2019-11-19 | Stop reason: ALTCHOICE

## 2019-10-02 ENCOUNTER — ROUTINE PRENATAL (OUTPATIENT)
Dept: OBGYN CLINIC | Facility: CLINIC | Age: 24
End: 2019-10-02

## 2019-10-02 VITALS
HEIGHT: 63 IN | SYSTOLIC BLOOD PRESSURE: 99 MMHG | DIASTOLIC BLOOD PRESSURE: 63 MMHG | HEART RATE: 98 BPM | BODY MASS INDEX: 25.52 KG/M2 | WEIGHT: 144 LBS

## 2019-10-02 DIAGNOSIS — B37.9 YEAST INFECTION: Primary | ICD-10-CM

## 2019-10-02 PROCEDURE — 99213 OFFICE O/P EST LOW 20 MIN: CPT | Performed by: OBSTETRICS & GYNECOLOGY

## 2019-10-02 PROCEDURE — 87070 CULTURE OTHR SPECIMN AEROBIC: CPT | Performed by: OBSTETRICS & GYNECOLOGY

## 2019-10-02 NOTE — PATIENT INSTRUCTIONS
We are Moving!!  Dear Friends and Patients, We are excited to announce that we will be moving to a new nearby location in early December, 2019! The new address is:    [address], [facility] [address]    Please be on the lookout for additional information as it becomes available  Thank you for trusting us with your care and we look forward to continuing to serve you in the future

## 2019-10-02 NOTE — PROGRESS NOTES
OB/GYN prenatal visit    S: 22 y o  [id number] 30w1d here for follow-up for yeast infection in pregnancy  She was initially diagnosed on 8/6/19 and treated with Monistat 7 and subsequently on 9/6/19 with fluconazole day 1, day 4, and day 7  She has no obstetric complaints, including pelvic pain, contractions, vaginal bleeding, loss of fluid, or decreased fetal movement  O:  Vitals:    10/02/19 1554   BP: 99/63   Pulse: 98       Gen: no acute distress, nonlabored breathing       Physical Exam   Genitourinary:       Cervix exhibits discharge  No erythema or tenderness in the vagina  Vaginal discharge found               A/P:    Problem List Items Addressed This Visit     None        1) Recurrent Yeast Infection   -Genital culture collected and sent   -Prescribed Terazol 7 (0 4%) vaginal cream  2) Return for routine prenatal in 3 weeks    20 weeks: level II ultrasound order  24-28 weeks: 28 week labs (CBC, RPR, 1hr GTT), Rh status/rhogam, C  28-32 weeks: tdap, flu vaccine, sign consent form, check in card, [facility]  34 weeks: perineal massage card, contraception and birth plan, ARI  36 weeks: collect GBS/PCN allergy, check fetal position, contraception and birth plan, Brian Retana MD  10/2/2019  4:24 PM

## 2019-10-05 LAB — BACTERIA GENITAL AEROBE CULT: NORMAL

## 2019-10-11 ENCOUNTER — ROUTINE PRENATAL (OUTPATIENT)
Dept: OBGYN CLINIC | Facility: CLINIC | Age: 24
End: 2019-10-11

## 2019-10-11 VITALS — BODY MASS INDEX: 25.69 KG/M2 | DIASTOLIC BLOOD PRESSURE: 68 MMHG | SYSTOLIC BLOOD PRESSURE: 105 MMHG | WEIGHT: 145 LBS

## 2019-10-11 DIAGNOSIS — K64.8 OTHER HEMORRHOIDS: ICD-10-CM

## 2019-10-11 DIAGNOSIS — Z34.93 PRENATAL CARE IN THIRD TRIMESTER: Primary | ICD-10-CM

## 2019-10-11 PROBLEM — Z3A.31 31 WEEKS GESTATION OF PREGNANCY: Status: ACTIVE | Noted: 2019-08-06

## 2019-10-11 PROCEDURE — 99215 OFFICE O/P EST HI 40 MIN: CPT | Performed by: NURSE PRACTITIONER

## 2019-10-11 RX ORDER — DIAPER,BRIEF,INFANT-TODD,DISP
EACH MISCELLANEOUS 4 TIMES DAILY PRN
Qty: 30 G | Refills: 0 | Status: ON HOLD | OUTPATIENT
Start: 2019-10-11 | End: 2019-11-19

## 2019-10-11 NOTE — PROGRESS NOTES
Assessment & Plan  22 y o  [id number] at 31w3d presenting for routine prenatal visit  Assessment & Plan  22 y o  [id number] at 31w3d presenting for routine prenatal visit  Pt C/O a hemorrhoids, discussed a high fiber diet, increased water intake and safe and effective use of Preparation H provided  Explained comprehensive culture was negative, pt states she thought she had yeast, explained yeast is always presents in our bodies , but cultures states there were no significant amounts  Pt discribes disomfort with intercourse, discussed comfort measures  Pt is taking daily iron, explained need for 2 pe day, CBc with diff to be repeated in 1 month, discussed diet high in iron      Problem List Items Addressed This Visit        Digestive    Other hemorrhoids    Relevant Medications    hydrocortisone 1 % cream       Other    Prenatal care in third trimester - Primary        ____________________________________________________________  Subjective    She denies contractions, loss of fluid, or vaginal bleeding  She feels regular fetal movements      Objective  /68   Wt 65 8 kg (145 lb)   LMP 03/05/2019 (Exact Date)   BMI 25 69 kg/m²     Fetal Heart Rate: 150    Patient's Active Problem List  Patient Active Problem List   Diagnosis    HSV infection    Previous child with congenital anomaly, currently pregnant, antepartum    Prenatal care in third trimester    31 weeks gestation of pregnancy    Short interval between pregnancies complicating pregnancy, antepartum, third trimester    Anemia during pregnancy in third trimester    Other hemorrhoids           Problem List Items Addressed This Visit        Digestive    Other hemorrhoids    Relevant Medications    hydrocortisone 1 % cream       Other    Prenatal care in third trimester - Primary        ____________________________________________________________  Subjective    She denies contractions, loss of fluid, or vaginal bleeding     She feels regular fetal movements  Objective  /68   Wt 65 8 kg (145 lb)   LMP 03/05/2019 (Exact Date)   BMI 25 69 kg/m²     Fetal Heart Rate: 150    Patient's Active Problem List  Patient Active Problem List   Diagnosis    HSV infection    Previous child with congenital anomaly, currently pregnant, antepartum    Prenatal care in third trimester    31 weeks gestation of pregnancy    Short interval between pregnancies complicating pregnancy, antepartum, third trimester    Anemia during pregnancy in third trimester    Other hemorrhoids     Plan      Problem List Items Addressed This Visit        Other    Prenatal care in third trimester - Primary        ____________________________________________________________  Subjective    She denies contractions, loss of fluid, or vaginal bleeding  She feels regular fetal movements  Objective  /68   Wt 65 8 kg (145 lb)   LMP 03/05/2019 (Exact Date)   BMI 25 69 kg/m²          Patient's Active Problem List  Patient Active Problem List   Diagnosis    HSV infection    Previous child with congenital anomaly, currently pregnant, antepartum    Prenatal care in third trimester    31 weeks gestation of pregnancy    Short interval between pregnancies complicating pregnancy, antepartum, third trimester    Anemia during pregnancy in third trimester     Plan  Use preparation H as directed for hemorrhoid  Take iron 2 times per day as directed  Be sure to take prenatal vitamins as directed  Use colace as needed for constipation  Increase fiber in your diet, increase water itake  Call with vaginal bleeding, loss of fluid, regular contractions, decreased fetal movement, other needs or concerns  Pt verbalized understanding of all discussed

## 2019-10-11 NOTE — PATIENT INSTRUCTIONS
Use preparation H as directed for hemorrhoid  Take iron 2 times per day as directed  Be sure to take prenatal vitamins as directed  Use colace as needed for constipation  Increase fiber in your diet, increase water itake  Call with vaginal bleeding, loss of fluid, regular contractions, decreased fetal movement, other needs or concerns

## 2019-10-25 ENCOUNTER — ROUTINE PRENATAL (OUTPATIENT)
Dept: OBGYN CLINIC | Facility: CLINIC | Age: 24
End: 2019-10-25

## 2019-10-25 VITALS — BODY MASS INDEX: 25.69 KG/M2 | DIASTOLIC BLOOD PRESSURE: 68 MMHG | WEIGHT: 145 LBS | SYSTOLIC BLOOD PRESSURE: 104 MMHG

## 2019-10-25 DIAGNOSIS — Z3A.33 33 WEEKS GESTATION OF PREGNANCY: Primary | ICD-10-CM

## 2019-10-25 DIAGNOSIS — Z34.93 PRENATAL CARE IN THIRD TRIMESTER: ICD-10-CM

## 2019-10-25 PROCEDURE — 99215 OFFICE O/P EST HI 40 MIN: CPT | Performed by: NURSE PRACTITIONER

## 2019-10-25 NOTE — PATIENT INSTRUCTIONS
Take iron as directed  Call with vaginal bleeding, loss of fluid, regular contractions, decreased fetal movement, other needs or concerns  Return in 2 weeks      We are Moving!!  Dear Friends and Patients, We are excited to announce that we will be moving to a new nearby location in early December, 2019! The new address is:    [address], [address]    Please be on the lookout for additional information as it becomes available  Thank you for trusting us with your care and we look forward to continuing to serve you in the future

## 2019-10-25 NOTE — PROGRESS NOTES
Assessment & Plan  22 y o  [id number] at 33w3d presenting for routine prenatal visit  Pt states her iron pills fell in the toilet and her insurance will not pay for more because its too early to refill, pt asked it it is ok to take an OTC iron pill  Explained OTC is an acceptable option  Safe and effective use reinforced along with iron rich diet  Pt plans Mirena for birth control    Problem List Items Addressed This Visit        Other    Prenatal care in third trimester    33 weeks gestation of pregnancy - Primary        ____________________________________________________________  Subjective  She is without complaint  She denies contractions, loss of fluid, or vaginal bleeding  She feels regular fetal movements      Objective  /68   Wt 65 8 kg (145 lb)   LMP 03/05/2019 (Exact Date)   BMI 25 69 kg/m²          Patient's Active Problem List  Patient Active Problem List   Diagnosis    HSV infection    Previous child with congenital anomaly, currently pregnant, antepartum    Prenatal care in third trimester    33 weeks gestation of pregnancy    Short interval between pregnancies complicating pregnancy, antepartum, third trimester    Anemia during pregnancy in third trimester    Other hemorrhoids     Plan  Take iron as directed  Call with vaginal bleeding, loss of fluid, regular contractions, decreased fetal movement, other needs or concerns  Return in 2 weeks  Pt verbalized understanding of all discussed

## 2019-10-25 NOTE — LETTER
October 25, 2019     Patient: Darlyn Delgado   YOB: 1995   Date of Visit: 10/25/2019       To Whom it May Concern: Tiera Calix is under my professional care  She was seen in my office on 10/25/2019  She may return to school on 10/29/2019  If you have any questions or concerns, please don't hesitate to call           Sincerely,          ANGEL Ba        CC: No Recipients

## 2019-11-13 ENCOUNTER — ROUTINE PRENATAL (OUTPATIENT)
Dept: OBGYN CLINIC | Facility: CLINIC | Age: 24
End: 2019-11-13

## 2019-11-13 VITALS — WEIGHT: 146.8 LBS | DIASTOLIC BLOOD PRESSURE: 68 MMHG | SYSTOLIC BLOOD PRESSURE: 101 MMHG | BODY MASS INDEX: 26 KG/M2

## 2019-11-13 DIAGNOSIS — B00.9 HSV (HERPES SIMPLEX VIRUS) INFECTION: ICD-10-CM

## 2019-11-13 DIAGNOSIS — Z3A.36 36 WEEKS GESTATION OF PREGNANCY: Primary | ICD-10-CM

## 2019-11-13 PROCEDURE — PNV: Performed by: OBSTETRICS & GYNECOLOGY

## 2019-11-13 PROCEDURE — 87653 STREP B DNA AMP PROBE: CPT | Performed by: OBSTETRICS & GYNECOLOGY

## 2019-11-13 PROCEDURE — 87491 CHLMYD TRACH DNA AMP PROBE: CPT | Performed by: OBSTETRICS & GYNECOLOGY

## 2019-11-13 PROCEDURE — 87591 N.GONORRHOEAE DNA AMP PROB: CPT | Performed by: OBSTETRICS & GYNECOLOGY

## 2019-11-13 RX ORDER — VALACYCLOVIR HYDROCHLORIDE 500 MG/1
500 TABLET, FILM COATED ORAL 2 TIMES DAILY
Qty: 80 TABLET | Refills: 0 | Status: SHIPPED | OUTPATIENT
Start: 2019-11-13 | End: 2020-07-07

## 2019-11-13 NOTE — PATIENT INSTRUCTIONS
We are Moving!!  Dear Friends and Patients, We are excited to announce that we will be moving to a new nearby location in early December, 2019! The new address is:    [address], [facility] [address]    Please be on the lookout for additional information as it becomes available  Thank you for trusting us with your care and we look forward to continuing to serve you in the future

## 2019-11-13 NOTE — PROGRESS NOTES
OB/GYN prenatal visit    S: 22 y o  [id number] 36w1d here for PN visit  She has had intermittent contractions and vaginal pressure  She denies any vaginal bleeding, loss of fluid, or decreased fetal movement  There has not been any change in her vaginal discharge  O:  Vitals:    11/13/19 1407   BP: 101/68       Gen: no acute distress, nonlabored breathing    Fetal heart rate: 133 bpm    A/P:    Problem List Items Addressed This Visit     None          1  Anemia   -Iron 2x/ daily   -Colace as needed for constipation  2  Labor precautions and [facility] reviewed  3  GBS and GC collected today  4  HSV prophylaxis; Valtrex 500mg BID  5   RTO in 1 week    Arminda Hernandez MD  11/13/2019  2:14 PM

## 2019-11-15 LAB
C TRACH DNA SPEC QL NAA+PROBE: NEGATIVE
N GONORRHOEA DNA SPEC QL NAA+PROBE: NEGATIVE

## 2019-11-16 LAB — GP B STREP DNA SPEC QL NAA+PROBE: NORMAL

## 2019-11-19 ENCOUNTER — HOSPITAL ENCOUNTER (OUTPATIENT)
Facility: HOSPITAL | Age: 24
Discharge: HOME | End: 2019-11-19
Attending: OBSTETRICS & GYNECOLOGY | Admitting: OBSTETRICS & GYNECOLOGY
Payer: COMMERCIAL

## 2019-11-19 VITALS
HEART RATE: 88 BPM | RESPIRATION RATE: 18 BRPM | SYSTOLIC BLOOD PRESSURE: 108 MMHG | TEMPERATURE: 98.5 F | DIASTOLIC BLOOD PRESSURE: 69 MMHG

## 2019-11-19 DIAGNOSIS — O99.013 ANEMIA DURING PREGNANCY IN THIRD TRIMESTER: Primary | ICD-10-CM

## 2019-11-19 LAB
BASOPHILS # BLD AUTO: 0.03 THOUSANDS/ΜL (ref 0–0.1)
BASOPHILS NFR BLD AUTO: 0 % (ref 0–1)
EOSINOPHIL # BLD AUTO: 0.03 THOUSAND/ΜL (ref 0–0.61)
EOSINOPHIL NFR BLD AUTO: 0 % (ref 0–6)
ERYTHROCYTE [DISTWIDTH] IN BLOOD BY AUTOMATED COUNT: 15.6 % (ref 11.6–15.1)
HCT VFR BLD AUTO: 29.4 % (ref 34.8–46.1)
HGB BLD-MCNC: 8.6 G/DL (ref 11.5–15.4)
IMM GRANULOCYTES # BLD AUTO: 0.06 THOUSAND/UL (ref 0–0.2)
IMM GRANULOCYTES NFR BLD AUTO: 1 % (ref 0–2)
LYMPHOCYTES # BLD AUTO: 1.36 THOUSANDS/ΜL (ref 0.6–4.47)
LYMPHOCYTES NFR BLD AUTO: 20 % (ref 14–44)
MCH RBC QN AUTO: 23.4 PG (ref 26.8–34.3)
MCHC RBC AUTO-ENTMCNC: 29.3 G/DL (ref 31.4–37.4)
MCV RBC AUTO: 80 FL (ref 82–98)
MONOCYTES # BLD AUTO: 0.54 THOUSAND/ΜL (ref 0.17–1.22)
MONOCYTES NFR BLD AUTO: 8 % (ref 4–12)
NEUTROPHILS # BLD AUTO: 4.96 THOUSANDS/ΜL (ref 1.85–7.62)
NEUTS SEG NFR BLD AUTO: 71 % (ref 43–75)
NRBC BLD AUTO-RTO: 0 /100 WBCS
PLATELET # BLD AUTO: 247 THOUSANDS/UL (ref 149–390)
PMV BLD AUTO: 10.2 FL (ref 8.9–12.7)
RBC # BLD AUTO: 3.67 MILLION/UL (ref 3.81–5.12)
WBC # BLD AUTO: 6.98 THOUSAND/UL (ref 4.31–10.16)

## 2019-11-19 PROCEDURE — 99203 OFFICE O/P NEW LOW 30 MIN: CPT

## 2019-11-19 PROCEDURE — 85025 COMPLETE CBC W/AUTO DIFF WBC: CPT | Performed by: OBSTETRICS & GYNECOLOGY

## 2019-11-19 PROCEDURE — NS001 PR NO SIGNATURE OR ATTESTATION: Performed by: OBSTETRICS & GYNECOLOGY

## 2019-11-19 RX ORDER — SODIUM CHLORIDE 9 MG/ML
20 INJECTION, SOLUTION INTRAVENOUS ONCE
Status: CANCELLED | OUTPATIENT
Start: 2019-11-22

## 2019-11-19 NOTE — PROGRESS NOTES
L&D Triage Note - OB/GYN  Sofya Graves 22 y o  female MRN: [id number]  Unit/Bed#: L&D 329-02 Encounter: [id number]    Patient is seen by HOMERO    ASSESSMENT:  Sofya Graves is a 22 y o  [id number] at 37w0d with contractions    PLAN:    1) Discharge from [facility] triage with term labor precautions    - Reviewed rupture of membranes, false vs true labor, decreased fetal movement, and vaginal bleeding   - Pt to call provider with any concerns and follow up at her next scheduled prenatal appointment    - Case discussed with Dr Martha Stein  2) Continue routine prenatal care  3) Iron deficiency anemia: repeat CBC ordered, patient to start Venofer infusions  - I ordered a new Therapy Plan  I called the infusion center at [facility]: [phone] to confirm the order was received  I specified the location of the infusion in the Treatment Department field  For twice weekly infusions, I specified a frequency of WEEKLY, ANY DAY, [2] times per week, with a [2] day separation  I will instruct the patient to call and schedule her first infusion  SUBJECTIVE:  Sofya Graves is a 22 y o  [id number] at 37w0d with an Estimated Date of Delivery: 12/10/19 here today for complaints of consistent cramping that started when she woke up this morning  She continued to feel them as she drove to school so she called the office and was told to come in  She denies vaginal bleeding  She thinks a few days ago she felt some trickling of watery fluid but since then has not felt any gushing/leaking of fluid  She does feel fetal movement but says the baby has not moved as much today  When asked to quantify the baby's movements, she said "in the last hour, maybe 10 times "     Her current obstetrical history is significant for:  - Anemia: Hgb 8 7 on 9/30  Due for 1 month recheck  Not taking iron tablets because "this pregnancy has been hard," "I can't deal with the constipation  "  - GBS negative  - short interval pregnancy, last baby delivered vaginally on 1/17/19  Her past obstetrical history is significant for SVDx  2  Hx PTD with second baby 8 days after fetoscopic surgery at [facility] for spina bifida  Selene Lang was born at 27w0d on 1/17/2019, weighed 2lb 2 2oz  Baby had myeloschisis and Arnold-Chiari malformation  OBJECTIVE:  There were no vitals filed for this visit      ROS:  Constitutional: Negative  Respiratory: Negative  Cardiovascular: Negative    Gastrointestinal: Negative    General Physical Exam:  General: in no apparent distress  Cardiovascular: Cor RRR  Lungs: non-labored breathing  Abdomen: abdomen is soft without significant tenderness, masses, organomegaly or guarding  Lower extremeties: nontender    Cervical Exam  SVE: 2 / 70% / -2    Fetal monitoring:  FHT:  135 bpm/ Moderate 6 - 25 bpm / 15 x 15 accelerations, no decelerations   [place]: contractions q3-10 min      Marijean Lesches, MD  PGY-1, OB/GYN  11/19/2019 10:29 AM

## 2019-11-19 NOTE — DISCHARGE INSTR - AVS FIRST PAGE
Your treatment includes one or more IV infusions by appointment only  You must call the [facility] to set up your appointment  Please call the number listed below to set up your first appointment  Thank you!   Miguel Ángel Spears [address]: [phone]

## 2019-11-19 NOTE — LETTER
[address] AND DELIVERY  [address]  Dept: [phone]    November 19, 2019     Patient: Darlyn Delgado   YOB: 1995   Date of Visit: 11/19/2019       To Whom it May Concern: Tiera Calix is under my professional care  She was seen in the hospital from 11/19/2019   to 11/19/19  Her  accompanied her to this visit  She may return to school on 11/20/19 without limitations  If you have any questions or concerns, please don't hesitate to call           Sincerely,          Parminder Addison MD
13-Aug-2018 16:24

## 2019-11-20 ENCOUNTER — ROUTINE PRENATAL (OUTPATIENT)
Dept: OBGYN CLINIC | Facility: CLINIC | Age: 24
End: 2019-11-20

## 2019-11-20 VITALS
HEART RATE: 77 BPM | WEIGHT: 146 LBS | SYSTOLIC BLOOD PRESSURE: 105 MMHG | BODY MASS INDEX: 25.86 KG/M2 | DIASTOLIC BLOOD PRESSURE: 68 MMHG

## 2019-11-20 DIAGNOSIS — O99.013 ANEMIA DURING PREGNANCY IN THIRD TRIMESTER: ICD-10-CM

## 2019-11-20 DIAGNOSIS — Z3A.37 37 WEEKS GESTATION OF PREGNANCY: ICD-10-CM

## 2019-11-20 DIAGNOSIS — Z34.93 PRENATAL CARE IN THIRD TRIMESTER: Primary | ICD-10-CM

## 2019-11-20 PROCEDURE — 99215 OFFICE O/P EST HI 40 MIN: CPT | Performed by: NURSE PRACTITIONER

## 2019-11-20 NOTE — PATIENT INSTRUCTIONS
Keep appointments for iron infusions  Call with vaginal bleeding, loss of fluid, regular contractions, decreased fetal movement, other needs or concerns  Return in 1 week      We are Moving!!  Dear Friends and Patients, We are excited to announce that we will be moving to a new nearby location in early December, 2019! The new address is:    [address], [address]    Please be on the lookout for additional information as it becomes available  Thank you for trusting us with your care and we look forward to continuing to serve you in the future

## 2019-11-20 NOTE — PROGRESS NOTES
Assessment & Plan  22 y o  [id number] at 37w1d presenting for routine prenatal visit  Pt was seen in the labor with cramping to R/O labor, told  And she was ordered iron infusion, was not in labor, cervix 2/70/-2, infrequent CTX on monitor  Told H&H was 8 7 and 27 6 told she needs iron infusions, first is tomorrow     Problem List Items Addressed This Visit        Other    Prenatal care in third trimester - Primary        ____________________________________________________________  Subjective     She denies contractions states she has intermittent cramps, loss of fluid, or vaginal bleeding  She feels regular fetal movements      Objective  /68   Pulse 77   Wt 66 2 kg (146 lb)   LMP 03/05/2019 (Exact Date)   BMI 25 86 kg/m²          Patient's Active Problem List  Patient Active Problem List   Diagnosis    HSV infection    Previous child with congenital anomaly, currently pregnant, antepartum    Prenatal care in third trimester    37 weeks gestation of pregnancy    Short interval between pregnancies complicating pregnancy, antepartum, third trimester    Anemia during pregnancy in third trimester    Other hemorrhoids     Plan  Keep appointments for iron infusions  Call with vaginal bleeding, loss of fluid, regular contractions, decreased fetal movement, other needs or concerns  Return in 1 week  Pt verbalized understanding of all discussed

## 2019-11-22 ENCOUNTER — HOSPITAL ENCOUNTER (OUTPATIENT)
Dept: INFUSION CENTER | Facility: HOSPITAL | Age: 24
Discharge: HOME | End: 2019-11-22
Payer: COMMERCIAL

## 2019-11-22 VITALS
RESPIRATION RATE: 16 BRPM | SYSTOLIC BLOOD PRESSURE: 100 MMHG | HEART RATE: 88 BPM | DIASTOLIC BLOOD PRESSURE: 53 MMHG | TEMPERATURE: 97.9 F

## 2019-11-22 DIAGNOSIS — O99.013 ANEMIA DURING PREGNANCY IN THIRD TRIMESTER: Primary | ICD-10-CM

## 2019-11-22 PROCEDURE — 96365 THER/PROPH/DIAG IV INF INIT: CPT

## 2019-11-22 RX ORDER — SODIUM CHLORIDE 9 MG/ML
20 INJECTION, SOLUTION INTRAVENOUS ONCE
Status: COMPLETED | OUTPATIENT
Start: 2019-11-22 | End: 2019-11-22

## 2019-11-22 RX ORDER — SODIUM CHLORIDE 9 MG/ML
20 INJECTION, SOLUTION INTRAVENOUS ONCE
Status: CANCELLED | OUTPATIENT
Start: 2019-11-24

## 2019-11-22 RX ADMIN — SODIUM CHLORIDE 20 ML/HR: 9 INJECTION, SOLUTION INTRAVENOUS at 14:25

## 2019-11-22 RX ADMIN — IRON SUCROSE 200 MG: 20 INJECTION, SOLUTION INTRAVENOUS at 14:35

## 2019-11-22 NOTE — PROGRESS NOTES
Pt tolerated todays dose of venofer without issue  Peripheral iv discontinued jelco intact  Scheduled next appts   Discharged ambulatory with avs

## 2019-11-25 ENCOUNTER — HOSPITAL ENCOUNTER (OUTPATIENT)
Dept: INFUSION CENTER | Facility: HOSPITAL | Age: 24
Discharge: HOME | End: 2019-11-25
Payer: COMMERCIAL

## 2019-11-25 DIAGNOSIS — O99.013 ANEMIA DURING PREGNANCY IN THIRD TRIMESTER: Primary | ICD-10-CM

## 2019-11-25 PROCEDURE — 96365 THER/PROPH/DIAG IV INF INIT: CPT

## 2019-11-25 RX ORDER — SODIUM CHLORIDE 9 MG/ML
20 INJECTION, SOLUTION INTRAVENOUS ONCE
Status: CANCELLED | OUTPATIENT
Start: 2019-11-27

## 2019-11-25 RX ORDER — SODIUM CHLORIDE 9 MG/ML
20 INJECTION, SOLUTION INTRAVENOUS ONCE
Status: COMPLETED | OUTPATIENT
Start: 2019-11-25 | End: 2019-11-25

## 2019-11-25 RX ADMIN — SODIUM CHLORIDE 20 ML/HR: 9 INJECTION, SOLUTION INTRAVENOUS at 08:21

## 2019-11-25 RX ADMIN — IRON SUCROSE 200 MG: 20 INJECTION, SOLUTION INTRAVENOUS at 08:24

## 2019-11-25 NOTE — PROGRESS NOTES
Pt admitted to infusion department today for venofer  tolerated dose #2 well  No adverse reactions noted  Peripheral iv discontinued after brief observation   Discharged ambulatory with avs

## 2019-11-27 ENCOUNTER — ROUTINE PRENATAL (OUTPATIENT)
Dept: OBGYN CLINIC | Facility: CLINIC | Age: 24
End: 2019-11-27

## 2019-11-27 ENCOUNTER — HOSPITAL ENCOUNTER (OUTPATIENT)
Dept: INFUSION CENTER | Facility: HOSPITAL | Age: 24
Discharge: HOME | End: 2019-11-27
Payer: COMMERCIAL

## 2019-11-27 VITALS
SYSTOLIC BLOOD PRESSURE: 90 MMHG | TEMPERATURE: 98 F | RESPIRATION RATE: 16 BRPM | DIASTOLIC BLOOD PRESSURE: 50 MMHG | HEART RATE: 72 BPM

## 2019-11-27 VITALS — WEIGHT: 151 LBS | BODY MASS INDEX: 26.75 KG/M2 | DIASTOLIC BLOOD PRESSURE: 67 MMHG | SYSTOLIC BLOOD PRESSURE: 102 MMHG

## 2019-11-27 DIAGNOSIS — Z34.93 PRENATAL CARE IN THIRD TRIMESTER: Primary | ICD-10-CM

## 2019-11-27 DIAGNOSIS — O99.013 ANEMIA DURING PREGNANCY IN THIRD TRIMESTER: ICD-10-CM

## 2019-11-27 DIAGNOSIS — O99.013 ANEMIA DURING PREGNANCY IN THIRD TRIMESTER: Primary | ICD-10-CM

## 2019-11-27 DIAGNOSIS — O09.893 SHORT INTERVAL BETWEEN PREGNANCIES COMPLICATING PREGNANCY, ANTEPARTUM, THIRD TRIMESTER: ICD-10-CM

## 2019-11-27 PROCEDURE — 96365 THER/PROPH/DIAG IV INF INIT: CPT

## 2019-11-27 PROCEDURE — 99213 OFFICE O/P EST LOW 20 MIN: CPT | Performed by: OBSTETRICS & GYNECOLOGY

## 2019-11-27 RX ORDER — SODIUM CHLORIDE 9 MG/ML
20 INJECTION, SOLUTION INTRAVENOUS ONCE
Status: CANCELLED | OUTPATIENT
Start: 2019-12-02

## 2019-11-27 RX ORDER — SODIUM CHLORIDE 9 MG/ML
20 INJECTION, SOLUTION INTRAVENOUS ONCE
Status: COMPLETED | OUTPATIENT
Start: 2019-11-27 | End: 2019-11-27

## 2019-11-27 RX ADMIN — SODIUM CHLORIDE 20 ML/HR: 0.9 INJECTION, SOLUTION INTRAVENOUS at 08:15

## 2019-11-27 RX ADMIN — IRON SUCROSE 200 MG: 20 INJECTION, SOLUTION INTRAVENOUS at 08:21

## 2019-11-27 NOTE — PLAN OF CARE
Problem: Potential for Falls  Goal: Patient will remain free of falls  Description  INTERVENTIONS:  - Assess patient frequently for physical needs  -  Identify cognitive and physical deficits and behaviors that affect risk of falls    -  [place] fall precautions as indicated by assessment   - Educate patient/family on patient safety including physical limitations  - Instruct patient to call for assistance with activity based on assessment  - Modify environment to reduce risk of injury  - Consider OT/PT consult to assist with strengthening/mobility  Outcome: Progressing

## 2019-11-27 NOTE — PROGRESS NOTES
OB/GYN prenatal visit    S: 22 y o  [id number] 38w1d here for PN visit  She has intermittent contractions and lower back pain  Her contractions are inconsistent  She denies vaginal bleeding, loss of fluid, or decreased fetal movement  O:  Vitals:    11/27/19 1505   BP: 102/67       Gen: no acute distress, nonlabored breathing  FHT: 138bpm  FH: 37cm           A/P:    Problem List Items Addressed This Visit        Other    Prenatal care in third trimester - Primary     IOL set up for 12/6 at 8am   Contraception Plan: Nexplanon in hospital          Short interval between pregnancies complicating pregnancy, antepartum, third trimester    Anemia during pregnancy in third trimester     Patient currently receiving Venofer  She has received 3 transfusions and will plan to get 2 more prior to IOL     Last Hb: 8 9                   Yamilka Gamez MD  11/27/2019  3:19 PM

## 2019-11-27 NOTE — ASSESSMENT & PLAN NOTE
Patient currently receiving Venofer  She has received 3 transfusions and will plan to get 2 more prior to IOL     Last Hb: 8 9

## 2019-12-02 ENCOUNTER — HOSPITAL ENCOUNTER (OUTPATIENT)
Dept: INFUSION CENTER | Facility: HOSPITAL | Age: 24
Discharge: HOME | End: 2019-12-02
Payer: COMMERCIAL

## 2019-12-02 DIAGNOSIS — O99.013 ANEMIA DURING PREGNANCY IN THIRD TRIMESTER: Primary | ICD-10-CM

## 2019-12-02 PROCEDURE — 96365 THER/PROPH/DIAG IV INF INIT: CPT

## 2019-12-02 RX ORDER — SODIUM CHLORIDE 9 MG/ML
20 INJECTION, SOLUTION INTRAVENOUS ONCE
Status: CANCELLED | OUTPATIENT
Start: 2019-12-04

## 2019-12-02 RX ORDER — SODIUM CHLORIDE 9 MG/ML
20 INJECTION, SOLUTION INTRAVENOUS ONCE
Status: COMPLETED | OUTPATIENT
Start: 2019-12-02 | End: 2019-12-02

## 2019-12-02 RX ADMIN — SODIUM CHLORIDE 20 ML/HR: 0.9 INJECTION, SOLUTION INTRAVENOUS at 08:26

## 2019-12-02 RX ADMIN — IRON SUCROSE 200 MG: 20 INJECTION, SOLUTION INTRAVENOUS at 08:26

## 2019-12-02 NOTE — PROGRESS NOTES
Pt tolerated venofer dose #5 today without adverse reaction   Observed 15 minutes post for itching/hives etc  Discharged ambulatory deferring avs

## 2019-12-03 ENCOUNTER — TELEPHONE (OUTPATIENT)
Dept: OBGYN CLINIC | Facility: CLINIC | Age: 24
End: 2019-12-03

## 2019-12-04 ENCOUNTER — ROUTINE PRENATAL (OUTPATIENT)
Dept: OBGYN CLINIC | Facility: CLINIC | Age: 24
End: 2019-12-04

## 2019-12-04 ENCOUNTER — HOSPITAL ENCOUNTER (OUTPATIENT)
Dept: INFUSION CENTER | Facility: HOSPITAL | Age: 24
Discharge: HOME | End: 2019-12-04
Payer: COMMERCIAL

## 2019-12-04 VITALS
SYSTOLIC BLOOD PRESSURE: 104 MMHG | DIASTOLIC BLOOD PRESSURE: 71 MMHG | BODY MASS INDEX: 26.68 KG/M2 | WEIGHT: 150.6 LBS | HEART RATE: 88 BPM

## 2019-12-04 VITALS
SYSTOLIC BLOOD PRESSURE: 102 MMHG | TEMPERATURE: 96.8 F | HEART RATE: 89 BPM | DIASTOLIC BLOOD PRESSURE: 59 MMHG | RESPIRATION RATE: 16 BRPM

## 2019-12-04 DIAGNOSIS — O99.013 ANEMIA DURING PREGNANCY IN THIRD TRIMESTER: Primary | ICD-10-CM

## 2019-12-04 DIAGNOSIS — Z3A.39 39 WEEKS GESTATION OF PREGNANCY: Primary | ICD-10-CM

## 2019-12-04 PROCEDURE — 96365 THER/PROPH/DIAG IV INF INIT: CPT

## 2019-12-04 PROCEDURE — 99213 OFFICE O/P EST LOW 20 MIN: CPT | Performed by: OBSTETRICS & GYNECOLOGY

## 2019-12-04 RX ORDER — SODIUM CHLORIDE 9 MG/ML
20 INJECTION, SOLUTION INTRAVENOUS ONCE
Status: COMPLETED | OUTPATIENT
Start: 2019-12-04 | End: 2019-12-04

## 2019-12-04 RX ORDER — SODIUM CHLORIDE 9 MG/ML
20 INJECTION, SOLUTION INTRAVENOUS ONCE
Status: CANCELLED | OUTPATIENT
Start: 2019-12-09

## 2019-12-04 RX ADMIN — SODIUM CHLORIDE 20 ML/HR: 0.9 INJECTION, SOLUTION INTRAVENOUS at 12:55

## 2019-12-04 RX ADMIN — IRON SUCROSE 200 MG: 20 INJECTION, SOLUTION INTRAVENOUS at 13:09

## 2019-12-04 NOTE — PLAN OF CARE
Problem: Potential for Falls  Goal: Patient will remain free of falls  Description  INTERVENTIONS:  - Assess patient frequently for physical needs  -  Identify cognitive and physical deficits and behaviors that affect risk of falls    -  [place] fall precautions as indicated by assessment   - Educate patient/family on patient safety including physical limitations  - Instruct patient to call for assistance with activity based on assessment  - Modify environment to reduce risk of injury  - Consider OT/PT consult to assist with strengthening/mobility  Outcome: Progressing

## 2019-12-04 NOTE — PROGRESS NOTES
Darlyn Delgado presents today for routine OB visit at 39w1d  /71   Pulse 88   Wt 68 3 kg (150 lb 9 6 oz)   LMP 03/05/2019 (Exact Date)   BMI 26 68 kg/m²   She reports feeling uncomfortable  Denies uterine contractions  Denies vaginal bleeding or leaking of fluid  Reports adequate fetal movement of at least 10 movements in 2 hours once daily  Vaginal cultures: negative  Scheduled for induction of labor in 2 days  Reviewed labor precautions and fetal kick counts as well as pre-eclampsia warning signs  Advised to continue prenatal vitamins and return post partum  G3 Problems (from 05/07/19 to present)     Problem Noted Resolved    Short interval between pregnancies complicating pregnancy, antepartum, third trimester 9/30/2019 by Viridiana Donnelly MD No    HSV infection 12/6/2018 by Asya Mtz MD No    Overview Signed 4/30/2019 10:55 AM by Asya Mtz MD     Overview:   Overview:   No current outbreaks, to start Valtrex suppression at 36 weeks

## 2019-12-04 NOTE — PROGRESS NOTES
Pt here for venofer today, tolerated well without complications  Patient having labor induction this Friday  Appt for next venofer Monday 12-9-19 canceled

## 2019-12-06 ENCOUNTER — HOSPITAL ENCOUNTER (OUTPATIENT)
Dept: LABOR AND DELIVERY | Facility: HOSPITAL | Age: 24
Discharge: HOME | DRG: 560 | End: 2019-12-06
Payer: COMMERCIAL

## 2019-12-06 ENCOUNTER — ANESTHESIA EVENT (INPATIENT)
Dept: ANESTHESIOLOGY | Facility: HOSPITAL | Age: 24
DRG: 560 | End: 2019-12-06
Payer: COMMERCIAL

## 2019-12-06 ENCOUNTER — HOSPITAL ENCOUNTER (INPATIENT)
Facility: HOSPITAL | Age: 24
LOS: 3 days | Discharge: HOME | DRG: 560 | End: 2019-12-09
Attending: STUDENT IN AN ORGANIZED HEALTH CARE EDUCATION/TRAINING PROGRAM | Admitting: STUDENT IN AN ORGANIZED HEALTH CARE EDUCATION/TRAINING PROGRAM
Payer: COMMERCIAL

## 2019-12-06 ENCOUNTER — ANESTHESIA (INPATIENT)
Dept: ANESTHESIOLOGY | Facility: HOSPITAL | Age: 24
DRG: 560 | End: 2019-12-06
Payer: COMMERCIAL

## 2019-12-06 DIAGNOSIS — Z3A.39 39 WEEKS GESTATION OF PREGNANCY: Primary | ICD-10-CM

## 2019-12-06 LAB
ABO GROUP BLD: NORMAL
BASOPHILS # BLD AUTO: 0.02 THOUSANDS/ΜL (ref 0–0.1)
BASOPHILS NFR BLD AUTO: 0 % (ref 0–1)
BLD GP AB SCN SERPL QL: NEGATIVE
EOSINOPHIL # BLD AUTO: 0.02 THOUSAND/ΜL (ref 0–0.61)
EOSINOPHIL NFR BLD AUTO: 0 % (ref 0–6)
ERYTHROCYTE [DISTWIDTH] IN BLOOD BY AUTOMATED COUNT: 23.3 % (ref 11.6–15.1)
HCT VFR BLD AUTO: 32.4 % (ref 34.8–46.1)
HGB BLD-MCNC: 9.8 G/DL (ref 11.5–15.4)
IMM GRANULOCYTES # BLD AUTO: 0.04 THOUSAND/UL (ref 0–0.2)
IMM GRANULOCYTES NFR BLD AUTO: 1 % (ref 0–2)
LYMPHOCYTES # BLD AUTO: 1.34 THOUSANDS/ΜL (ref 0.6–4.47)
LYMPHOCYTES NFR BLD AUTO: 18 % (ref 14–44)
MCH RBC QN AUTO: 25.3 PG (ref 26.8–34.3)
MCHC RBC AUTO-ENTMCNC: 30.2 G/DL (ref 31.4–37.4)
MCV RBC AUTO: 84 FL (ref 82–98)
MONOCYTES # BLD AUTO: 0.45 THOUSAND/ΜL (ref 0.17–1.22)
MONOCYTES NFR BLD AUTO: 6 % (ref 4–12)
NEUTROPHILS # BLD AUTO: 5.76 THOUSANDS/ΜL (ref 1.85–7.62)
NEUTS SEG NFR BLD AUTO: 75 % (ref 43–75)
NRBC BLD AUTO-RTO: 0 /100 WBCS
PLATELET # BLD AUTO: 225 THOUSANDS/UL (ref 149–390)
PMV BLD AUTO: 10.8 FL (ref 8.9–12.7)
RBC # BLD AUTO: 3.88 MILLION/UL (ref 3.81–5.12)
RH BLD: NEGATIVE
SPECIMEN EXPIRATION DATE: NORMAL
WBC # BLD AUTO: 7.63 THOUSAND/UL (ref 4.31–10.16)

## 2019-12-06 PROCEDURE — 3E033VJ INTRODUCTION OF OTHER HORMONE INTO PERIPHERAL VEIN, PERCUTANEOUS APPROACH: ICD-10-PCS | Performed by: OBSTETRICS & GYNECOLOGY

## 2019-12-06 PROCEDURE — 86900 BLOOD TYPING SEROLOGIC ABO: CPT | Performed by: STUDENT IN AN ORGANIZED HEALTH CARE EDUCATION/TRAINING PROGRAM

## 2019-12-06 PROCEDURE — 99024 POSTOP FOLLOW-UP VISIT: CPT | Performed by: STUDENT IN AN ORGANIZED HEALTH CARE EDUCATION/TRAINING PROGRAM

## 2019-12-06 PROCEDURE — 85025 COMPLETE CBC W/AUTO DIFF WBC: CPT | Performed by: STUDENT IN AN ORGANIZED HEALTH CARE EDUCATION/TRAINING PROGRAM

## 2019-12-06 PROCEDURE — 86850 RBC ANTIBODY SCREEN: CPT | Performed by: STUDENT IN AN ORGANIZED HEALTH CARE EDUCATION/TRAINING PROGRAM

## 2019-12-06 PROCEDURE — 86592 SYPHILIS TEST NON-TREP QUAL: CPT | Performed by: STUDENT IN AN ORGANIZED HEALTH CARE EDUCATION/TRAINING PROGRAM

## 2019-12-06 PROCEDURE — 86901 BLOOD TYPING SEROLOGIC RH(D): CPT | Performed by: STUDENT IN AN ORGANIZED HEALTH CARE EDUCATION/TRAINING PROGRAM

## 2019-12-06 PROCEDURE — 4A0HXCZ MEASUREMENT OF PRODUCTS OF CONCEPTION, CARDIAC RATE, EXTERNAL APPROACH: ICD-10-PCS | Performed by: OBSTETRICS & GYNECOLOGY

## 2019-12-06 RX ORDER — LIDOCAINE HYDROCHLORIDE AND EPINEPHRINE 15; 5 MG/ML; UG/ML
INJECTION, SOLUTION EPIDURAL AS NEEDED
Status: DISCONTINUED | OUTPATIENT
Start: 2019-12-06 | End: 2019-12-07 | Stop reason: SURG

## 2019-12-06 RX ORDER — ROPIVACAINE HYDROCHLORIDE 2 MG/ML
INJECTION, SOLUTION EPIDURAL; INFILTRATION; PERINEURAL CONTINUOUS PRN
Status: DISCONTINUED | OUTPATIENT
Start: 2019-12-06 | End: 2019-12-07 | Stop reason: SURG

## 2019-12-06 RX ORDER — SODIUM CHLORIDE, SODIUM LACTATE, POTASSIUM CHLORIDE, CALCIUM CHLORIDE 600; 310; 30; 20 MG/100ML; MG/100ML; MG/100ML; MG/100ML
125 INJECTION, SOLUTION INTRAVENOUS CONTINUOUS
Status: DISCONTINUED | OUTPATIENT
Start: 2019-12-06 | End: 2019-12-07

## 2019-12-06 RX ORDER — VALACYCLOVIR HYDROCHLORIDE 500 MG/1
500 TABLET, FILM COATED ORAL 2 TIMES DAILY
Status: DISCONTINUED | OUTPATIENT
Start: 2019-12-06 | End: 2019-12-09 | Stop reason: HOSPADM

## 2019-12-06 RX ORDER — OXYTOCIN/RINGER'S LACTATE 30/500 ML
1-30 PLASTIC BAG, INJECTION (ML) INTRAVENOUS
Status: DISCONTINUED | OUTPATIENT
Start: 2019-12-06 | End: 2019-12-07

## 2019-12-06 RX ORDER — ONDANSETRON 2 MG/ML
4 INJECTION INTRAMUSCULAR; INTRAVENOUS EVERY 6 HOURS PRN
Status: DISCONTINUED | OUTPATIENT
Start: 2019-12-06 | End: 2019-12-07

## 2019-12-06 RX ORDER — EPHEDRINE SULFATE 50 MG/ML
INJECTION INTRAVENOUS AS NEEDED
Status: DISCONTINUED | OUTPATIENT
Start: 2019-12-06 | End: 2019-12-07 | Stop reason: SURG

## 2019-12-06 RX ORDER — ROPIVACAINE HYDROCHLORIDE 2 MG/ML
INJECTION, SOLUTION EPIDURAL; INFILTRATION; PERINEURAL
Status: COMPLETED
Start: 2019-12-06 | End: 2019-12-06

## 2019-12-06 RX ADMIN — VALACYCLOVIR HYDROCHLORIDE 500 MG: 500 TABLET, FILM COATED ORAL at 23:30

## 2019-12-06 RX ADMIN — ROPIVACAINE HYDROCHLORIDE 10 ML/HR: 2 INJECTION, SOLUTION EPIDURAL; INFILTRATION at 22:45

## 2019-12-06 RX ADMIN — SODIUM CHLORIDE, SODIUM LACTATE, POTASSIUM CHLORIDE, AND CALCIUM CHLORIDE 125 ML/HR: .6; .31; .03; .02 INJECTION, SOLUTION INTRAVENOUS at 23:56

## 2019-12-06 RX ADMIN — SODIUM CHLORIDE, SODIUM LACTATE, POTASSIUM CHLORIDE, AND CALCIUM CHLORIDE 125 ML/HR: .6; .31; .03; .02 INJECTION, SOLUTION INTRAVENOUS at 21:31

## 2019-12-06 RX ADMIN — Medication 2 MILLI-UNITS/MIN: at 23:56

## 2019-12-06 RX ADMIN — ROPIVACAINE HYDROCHLORIDE: 2 INJECTION, SOLUTION EPIDURAL; INFILTRATION at 22:47

## 2019-12-06 RX ADMIN — SODIUM CHLORIDE, SODIUM LACTATE, POTASSIUM CHLORIDE, AND CALCIUM CHLORIDE 999 ML/HR: .6; .31; .03; .02 INJECTION, SOLUTION INTRAVENOUS at 22:34

## 2019-12-06 RX ADMIN — EPHEDRINE SULFATE 5 MG: 50 INJECTION, SOLUTION INTRAVENOUS at 22:42

## 2019-12-06 RX ADMIN — LIDOCAINE HYDROCHLORIDE AND EPINEPHRINE 3 ML: 15; 5 INJECTION, SOLUTION EPIDURAL at 22:19

## 2019-12-07 ENCOUNTER — APPOINTMENT (INPATIENT)
Dept: RADIOLOGY | Facility: HOSPITAL | Age: 24
DRG: 560 | End: 2019-12-07
Payer: COMMERCIAL

## 2019-12-07 PROBLEM — Z3A.39 39 WEEKS GESTATION OF PREGNANCY: Status: ACTIVE | Noted: 2019-12-07

## 2019-12-07 LAB
BASE EXCESS BLDCOA CALC-SCNC: -4.2 MMOL/L (ref 3–11)
BASE EXCESS BLDCOV CALC-SCNC: -1.3 MMOL/L (ref 1–9)
HCO3 BLDCOA-SCNC: 25.1 MMOL/L (ref 17.3–27.3)
HCO3 BLDCOV-SCNC: 23.4 MMOL/L (ref 12.2–28.6)
OXYHGB MFR BLDCOA: 7.3 %
OXYHGB MFR BLDCOV: 76.9 %
PCO2 BLDCOA: 65.6 MM[HG] (ref 30–60)
PCO2 BLDCOV: 39.2 MM HG (ref 27–43)
PH BLDCOA: 7.2 [PH] (ref 7.23–7.43)
PH BLDCOV: 7.39 [PH] (ref 7.19–7.49)
PO2 BLDCOV: 33.8 MM HG (ref 15–45)
SAO2 % BLDCOV: 15.9 ML/DL

## 2019-12-07 PROCEDURE — 82805 BLOOD GASES W/O2 SATURATION: CPT | Performed by: STUDENT IN AN ORGANIZED HEALTH CARE EDUCATION/TRAINING PROGRAM

## 2019-12-07 PROCEDURE — 10907ZC DRAINAGE OF AMNIOTIC FLUID, THERAPEUTIC FROM PRODUCTS OF CONCEPTION, VIA NATURAL OR ARTIFICIAL OPENING: ICD-10-PCS | Performed by: OBSTETRICS & GYNECOLOGY

## 2019-12-07 PROCEDURE — NC001 PR NO CHARGE: Performed by: OBSTETRICS & GYNECOLOGY

## 2019-12-07 PROCEDURE — 59410 OBSTETRICAL CARE: CPT | Performed by: OBSTETRICS & GYNECOLOGY

## 2019-12-07 PROCEDURE — 0KQM0ZZ REPAIR PERINEUM MUSCLE, OPEN APPROACH: ICD-10-PCS | Performed by: OBSTETRICS & GYNECOLOGY

## 2019-12-07 PROCEDURE — 72170 X-RAY EXAM OF PELVIS: CPT

## 2019-12-07 RX ORDER — OXYTOCIN/RINGER'S LACTATE 30/500 ML
250 PLASTIC BAG, INJECTION (ML) INTRAVENOUS CONTINUOUS
Status: DISCONTINUED | OUTPATIENT
Start: 2019-12-07 | End: 2019-12-09 | Stop reason: HOSPADM

## 2019-12-07 RX ORDER — IBUPROFEN 600 MG/1
600 TABLET ORAL EVERY 6 HOURS PRN
Status: DISCONTINUED | OUTPATIENT
Start: 2019-12-07 | End: 2019-12-09 | Stop reason: HOSPADM

## 2019-12-07 RX ORDER — FERROUS SULFATE 325(65) MG
325 TABLET ORAL
Status: DISCONTINUED | OUTPATIENT
Start: 2019-12-07 | End: 2019-12-09 | Stop reason: HOSPADM

## 2019-12-07 RX ORDER — CALCIUM CARBONATE 200(500)MG
500 TABLET,CHEWABLE ORAL AS NEEDED
Status: DISCONTINUED | OUTPATIENT
Start: 2019-12-07 | End: 2019-12-07

## 2019-12-07 RX ORDER — ONDANSETRON 2 MG/ML
4 INJECTION INTRAMUSCULAR; INTRAVENOUS EVERY 8 HOURS PRN
Status: DISCONTINUED | OUTPATIENT
Start: 2019-12-07 | End: 2019-12-09 | Stop reason: HOSPADM

## 2019-12-07 RX ORDER — CALCIUM CARBONATE 200(500)MG
500 TABLET,CHEWABLE ORAL DAILY PRN
Status: DISCONTINUED | OUTPATIENT
Start: 2019-12-07 | End: 2019-12-07

## 2019-12-07 RX ORDER — CALCIUM CARBONATE 200(500)MG
1000 TABLET,CHEWABLE ORAL DAILY PRN
Status: DISCONTINUED | OUTPATIENT
Start: 2019-12-07 | End: 2019-12-09 | Stop reason: HOSPADM

## 2019-12-07 RX ORDER — DOCUSATE SODIUM 100 MG/1
100 CAPSULE, LIQUID FILLED ORAL 2 TIMES DAILY
Status: DISCONTINUED | OUTPATIENT
Start: 2019-12-07 | End: 2019-12-09 | Stop reason: HOSPADM

## 2019-12-07 RX ORDER — SIMETHICONE 80 MG
80 TABLET,CHEWABLE ORAL 4 TIMES DAILY PRN
Status: DISCONTINUED | OUTPATIENT
Start: 2019-12-07 | End: 2019-12-09 | Stop reason: HOSPADM

## 2019-12-07 RX ORDER — DIPHENHYDRAMINE HCL 25 MG
25 TABLET ORAL EVERY 6 HOURS PRN
Status: DISCONTINUED | OUTPATIENT
Start: 2019-12-07 | End: 2019-12-09 | Stop reason: HOSPADM

## 2019-12-07 RX ORDER — DIAPER,BRIEF,INFANT-TODD,DISP
1 EACH MISCELLANEOUS AS NEEDED
Status: DISCONTINUED | OUTPATIENT
Start: 2019-12-07 | End: 2019-12-09 | Stop reason: HOSPADM

## 2019-12-07 RX ORDER — ACETAMINOPHEN 325 MG/1
650 TABLET ORAL EVERY 6 HOURS PRN
Status: DISCONTINUED | OUTPATIENT
Start: 2019-12-07 | End: 2019-12-09 | Stop reason: HOSPADM

## 2019-12-07 RX ORDER — OXYCODONE HYDROCHLORIDE 5 MG/1
5 TABLET ORAL EVERY 4 HOURS PRN
Status: DISCONTINUED | OUTPATIENT
Start: 2019-12-07 | End: 2019-12-09 | Stop reason: HOSPADM

## 2019-12-07 RX ADMIN — CALCIUM CARBONATE (ANTACID) CHEW TAB 500 MG 500 MG: 500 CHEW TAB at 01:10

## 2019-12-07 RX ADMIN — IBUPROFEN 600 MG: 600 TABLET ORAL at 12:30

## 2019-12-07 RX ADMIN — IBUPROFEN 600 MG: 600 TABLET ORAL at 23:24

## 2019-12-07 RX ADMIN — DOCUSATE SODIUM 100 MG: 100 CAPSULE, LIQUID FILLED ORAL at 17:48

## 2019-12-07 RX ADMIN — PRENATAL VIT W/ FE FUMARATE-FA TAB 27-0.8 MG 1 TABLET: 27-0.8 TAB at 09:00

## 2019-12-07 RX ADMIN — FERROUS SULFATE TAB 325 MG (65 MG ELEMENTAL FE) 325 MG: 325 (65 FE) TAB at 09:00

## 2019-12-07 RX ADMIN — HYDROCORTISONE 1 APPLICATION: 1 CREAM TOPICAL at 12:32

## 2019-12-07 RX ADMIN — DOCUSATE SODIUM 100 MG: 100 CAPSULE, LIQUID FILLED ORAL at 09:00

## 2019-12-07 RX ADMIN — BENZOCAINE AND LEVOMENTHOL: 200; 5 SPRAY TOPICAL at 12:33

## 2019-12-07 RX ADMIN — ROPIVACAINE HYDROCHLORIDE: 2 INJECTION, SOLUTION EPIDURAL; INFILTRATION at 06:54

## 2019-12-07 RX ADMIN — ACETAMINOPHEN 650 MG: 325 TABLET ORAL at 17:47

## 2019-12-07 RX ADMIN — VALACYCLOVIR HYDROCHLORIDE 500 MG: 500 TABLET, FILM COATED ORAL at 09:00

## 2019-12-07 RX ADMIN — OXYCODONE HYDROCHLORIDE 5 MG: 5 TABLET ORAL at 12:35

## 2019-12-07 RX ADMIN — ONDANSETRON 4 MG: 2 INJECTION INTRAMUSCULAR; INTRAVENOUS at 05:51

## 2019-12-07 RX ADMIN — VALACYCLOVIR HYDROCHLORIDE 500 MG: 500 TABLET, FILM COATED ORAL at 17:47

## 2019-12-07 RX ADMIN — WITCH HAZEL 1 PAD: 500 SOLUTION RECTAL; TOPICAL at 12:32

## 2019-12-07 NOTE — DISCHARGE SUMMARY
Discharge Summary - Avril Dupont 22 y o  female MRN: [id number]    Unit/Bed#: L&D 323-01 Encounter: [id number]    Admission Date: 2019     Discharge Date: 19    Admitting Diagnosis:   1  Pregnancy at 39w4d  2  Elective IOL  3  Rh negative  4  Anemia requiring IV infusions  5  Prior pregnancy with lumbodsacral NTD with fetoscopic repair at 27wk followed by PPROM and delivery 8 days after    Discharge Diagnosis: same, delivered  Pubic symphysis separation    Procedures: spontaneous vaginal delivery and repair of 2nd degree laceration    Attending: Verónica Prince MD    Hospital Course: Avril Dupont is a 22 y o  now [id number] at 39w4d wks who was initially admitted for elective IOL  She was noted to be 4/70/-2 and received an epidural for anesthesia  Pitocin was initiated, however, due to two prolonged decelerations, was subsequently discontinued  FHT returned to category I, but was notable for intermittent late and variable decelerations during her labor course with moderate variability and positive accelerations  Patient was managed expectantly and was noted to go into spontaneous labor  She progressed to 5-6/90/-2 when amniotomy was able to be successfully performed for clear fluid at 0637  Pt continued to make excellent cervical change and progressed to complete dilation without complication  At that time, meconium-stained fluid was appreciated  NICU was notified to be present for delivery  She delivered a viable male  on 19 at 0803  Weight 9lbs 0oz via spontaneous vaginal delivery  Apgars were 9 (1 min) and 9 (5 min)   transferred to NICU  Patient tolerated the procedure well and was transferred to recovery in stable condition  Her postpartum course was complicated by pubic symphysis separation requiring PT evaluation  Xray demonstrated separation of 17-18mm after patient was evaluated for complaints of pelvic girdle pain and difficulty walking   She was recommended for home PT, referral given by Case Management  Pre-delivery hemoglobin was 9 8  Her postpartum pain was well controlled with oral analgesics  She received Nexplanon on PPD#2 for contraception  On day of discharge, she was ambulating and able to reasonably perform all ADLs  She was voiding and had appropriate bowel function  Pain was well controlled  She was discharged home on postpartum day # S without complications  Patient was instructed to follow up with her OB as an outpatient and was given appropriate warnings to call provider if she develops signs of infection or uncontrolled pain  Complications: none apparent    Condition at discharge: good      Discharge Medications:   Prenatal vitamin daily for 6 months or the duration of nursing whichever is longer  Motrin 600 mg orally every 6 hours as needed for pain  Tylenol (over the counter) per bottle directions as needed for pain  Hydrocortisone cream 1% (over the counter) applied 1-2x daily to hemorrhoids as needed  Witch hazel pads for hemorrhoidal discomfort as needed    Discharge instructions: See after visit summary for complete information  Do not place anything (no partner, tampons or douche) in your vagina for 6 weeks  You may walk for exercise for the first 6 weeks then gradually return to your usual activities     Please do not drive for 1 week if you have no stitches and for 2 weeks if you have stitches or underwent a  delivery     You may take baths or shower per your preference     Please look at your bust (breasts) in the mirror daily and call for redness or tenderness or increased warmth     If you have had a  please look at your incision daily as well and call us for increasing redness or steady drainage from the incision     Please call us for temperature > 100 4*F or 38* C, worsening pain or a foul discharge      Disposition: Home    Planned Readmission: No    Marya Torres MD  OBGYN, PGY-3  2019 3:56 PM      I have participated in the care of this patient during this hospitalization and agree with the discharge summary      Apurva Hayden DO  12/9/2019  4:02 PM

## 2019-12-07 NOTE — PROGRESS NOTES
Patient having trouble with ambulation took several minutes to go from bathroom to bed and a lot of assistance due to groin pain  No unusual swelling  Dr Trudi Rodriguez in to assess

## 2019-12-07 NOTE — ADDENDUM NOTE
Addendum  created 12/07/19 0924 by Julio Royal DO    Intraprocedure Event edited, Intraprocedure LDAs edited, LDA properties accepted

## 2019-12-07 NOTE — DISCHARGE INSTRUCTIONS
Self Care After Delivery   AMBULATORY CARE:   The postpartum period  is the period of time from delivery to about 6 weeks  During this time you may experience many physical and emotional changes  It is important to understand what is normal and when you need to call your healthcare provider  It is also important to know how to care for yourself during this time  Call 911 for any of the following:   · You soak through 1 pad in 15 minutes, have blurry vision, clammy or pale skin, and feel faint  · You faint or lose consciousness  · Your heart is beating faster than normal      · You have trouble breathing  · You cough up blood  Seek care immediately if:   · You soak through 1 or more pads in an hour, or pass blood clots larger than a quarter from your vagina  · Your leg is painful, red, and larger than normal      · You have severe abdominal pain  · You have a bad headache or changes in your vision  · Your episiotomy or C section incision is red, swollen, bleeding, or draining pus  · Your incision comes apart  · You see or hear things that are not there, or have thoughts of harming yourself or your baby  Contact your obstetrician or midwife if:   · You have a fever  · You have new or worsening pain in your abdomen or vagina  · You continue to have the baby blues 10 days after you deliver  · You have trouble sleeping  · You have foul-smelling discharge from your vagina  · You have pain or burning when you urinate  · You do not have a bowel movement for 3 days or more  · You have nausea or are vomiting  · You have hard lumps or red streaks over your breasts  · You have cracked nipples or bleed from your nipples  · You have questions or concerns about your condition or care  Physical changes:   The following are normal changes after you give birth:  · Pain in the area between your anus and vagina    · Breast pain    · Constipation or hemorrhoids    · Hot or cold flashes    · Vaginal bleeding or discharge    · Mild to moderate abdominal cramping    · Difficulty controlling bowel movements or urine  Emotional changes: The following are symptoms of the baby blues, or normal emotions after you give birth  The changes in your emotions may be caused by a drop in hormone levels after you deliver  If these symptoms last longer than 1 to 2 weeks after you give birth, you may have postpartum depression  · Feeling irritable    · Feeling sad    · Crying for no reason    · Feeling anxious  Breast care for nursing mothers: You may have sore breasts for 3 to 6 days after you give birth  This happens as your milk begins to fill your breasts  You may also have sore breasts if you do not breastfeed frequently  Do the following to care for your breasts:  · Apply a moist, warm, compress to your breast as directed  This may help soothe your breasts  Make sure the washcloth is not too hot before you apply it to your breast      · Nurse your baby or pump your milk frequently  This may prevent clogged milk ducts  Ask your healthcare provider how often to nurse or pump  · Massage your breasts as directed  This may help increase your milk flow  Gently rub your breasts in a circular motion before you breastfeed  You may need to gently squeeze your breast or nipple to help release milk  You can also use a breast pump to help release milk from your breast      · Wash your breasts with warm water only  Do not put soap on your nipples  Soap may cause your nipples to become dry  · Apply lanolin cream to your nipples as directed  Lanolin cream may add moisture to your skin and prevent nipple dryness  Always  wash off lanolin cream with warm water before you breastfeed  · Place pads in your bra  Your nipples may leak milk when you are not breastfeeding  You can place pads inside of your bra to help prevent leaking onto your clothing   Ask your healthcare provider where to purchase bra pads  · Get breastfeeding support if needed  There are healthcare providers who can answer questions about breastfeeding and provide you with support  Ask your healthcare provider who you can contact if you need breastfeeding support  Breast care for non-nursing mothers:  Milk will fill your breasts even if you bottle feed your baby  Do the following to help stop your milk from filling your breasts and causing pain:  · Wear a bra with support at all times  A sports bra or a tight-fitting bra will help stop your milk from coming in  · Apply ice on each breast for 15 to 20 minutes every hour or as directed  Use an ice pack, or put crushed ice in a plastic bag  Cover it with a towel  Ice helps your milk ducts shrink  · Keep your breasts away from warm water  Warm water will make it easier for milk to fill your breasts  Stand with your breasts away from warm water in the shower  · Limit how much you touch your breasts  This will prevent them from filling with milk  Perineum care: Your perineum is the area between your rectum and vagina  It is normal to have swelling and pain in this area after you give birth  If you had an episiotomy, your healthcare provider may give you special instructions  · Clean your perineum after you use the bathroom  This may prevent infection and help with healing  Use a spray bottle with warm water to clean your perineum  You may also gently spray warm water against your perineum when you urinate  Always wipe front to back  · Take a sitz bath as directed  A sitz bath may help relieve swelling and pain  Fill your bath tub or bucket with water up to your hips and sit in the water  Use cold water for 2 days after you deliver  Then use warm water  Ask your healthcare provider for more information about a sitz bath  · Apply ice packs for the first 24 hours or as directed  Use a plastic glove filled with ice or buy an ice pack   Wrap the ice pack or plastic glove in a small towel or wash cloth  Place the ice pack on your perineum for 20 minutes at a time  · Sit on a donut-shaped pillow  This may relieve pressure on your perineum when you sit  · Use wipes with medicine or take pills as directed  Your healthcare provider may tell you to use witch hazel pads  You can place witch hazel pads in the refrigerator before you apply them to your perineum  He may also tell you to take NSAIDs  Ask your healthcare provider how often to take pills or use wipes with medicine  · Do not go swimming or take tub baths for 4 to 6 weeks or as directed  This will help prevent an infection in your vagina or uterus  Bowel and bladder care: It may take 3 to 5 days to have a bowel movement after you deliver your baby  You can do the following to prevent or manage constipation, and get control of your bowel or bladder:  · Take stool softeners as directed  A stool softener is medicine that will make your bowel movements softer  This may prevent or relieve constipation  A stool softener may also make bowel movements less painful  · Drink plenty of liquids  Ask how much liquid to drink each day and which liquids are best for you  Liquids may help prevent constipation  · Eat foods high in fiber  Examples include fruits, vegetables, grains, beans, and lentils  Ask your healthcare provider how much fiber you need each day  Fiber may prevent constipation  · Do Kegel exercises as directed  Kegel exercises will help strengthen the muscles that control bowel movements and urination  Ask your healthcare provider for more information on Kegel exercises  · Apply cold compresses or medicine to hemorrhoids as directed  This may relieve swelling and pain  Your healthcare provider may tell you to apply ice or wipes with medicine to your hemorrhoids  He may also tell you to use a sitz bath   Ask your healthcare for more information on how to manage hemorrhoids  Nutrition:  Good nutrition is important in the postpartum period  It will help you return to a healthy weight, increase your energy levels, and prevent constipation  It will also help you get enough nutrients and calories if you are going to breastfeed your baby  · Eat a variety of healthy foods  Healthy foods include fruits, vegetables, whole-grain breads, low-fat dairy products, beans, lean meats, and fish  You may need 500 to 700 additional calories each day if you breastfeed your baby  You may also need extra protein  · Limit foods with added sugar and high amounts of fat  These foods are high in calories and low in healthy nutrients  Read food labels so you know how much sugar and fat is in the food you want to eat  · Drink 8 to 10 glasses of water per day  Water will help you make plenty of milk for your baby  It will also help prevent constipation  Drink a glass of water every time you breastfeed your baby  · Take vitamins as directed  Ask your healthcare provider what vitamins you need  · Limit caffeine and alcohol if you are breastfeeding  Caffeine and alcohol can get into your breast milk  Caffeine and alcohol can make your baby fussy  They can also interfere with your baby's sleep  Ask your healthcare provider if you can drink alcohol or caffeine  Rest and sleep: You may feel very tired in the postpartum period  Enough sleep will help you heal and give you energy to care for your baby  The following may help you get sleep and rest:  · Nap when your baby naps  Your baby may nap several times during the day  Get rest during this time  · Limit visitors  Many people may want to see you and your baby  Ask friends or family to visit on different days  This will give you time to rest      · Do not plan too much for one day  Put off household chores so that you have time to rest  Gradually do more each day  · Ask for help from family, friends, or neighbors    Ask them to help you with laundry, cleaning, or errands  Also ask someone to watch the baby while you take a nap or relax  Ask your partner to help with the care of your baby  Pump some of your breast milk so your partner can feed your baby during the night  Exercise after delivery:  Wait until your healthcare provider says it is okay to exercise  Exercise can help you lose weight, increase your energy levels, and manage your mood  It can also prevent constipation and blood clots  Start with gentle exercises such as walking  Do more as you have more energy  You may need to avoid abdominal exercises for 1 to 2 weeks after you deliver  Talk to your healthcare provider about an exercise plan that is right for you  Sexual activity after delivery:   · Do not have sex until your healthcare provider says it is okay  You may need to wait 4 to 6 weeks before you have sex  This may prevent infection and allow time to heal      · Your menstrual cycle may begin as soon as 3 weeks after you deliver  Your period may be delayed if you breastfeed your baby  You can become pregnant before you get your first postpartum period  Talk to your healthcare provider about birth control that is right for you  Some types of birth control are not safe during breastfeeding  For support and more information:  Join a support group for new mothers  Ask for help from family and friends with chores, errands, and care of your baby  · Office of Women's Health, [place] Department of Health and Human Services  [address], [id number] [place] [address]  [address], [id number] [place] [address]  Phone: 8- 660 - 775-9348  Web Address: www womenshealth gov  · [facility] Postpartum [address] , [address] [place] [address]  [address]  Web Address: [employer]  org/pregnancy/postpartum-care  aspx  Follow up with your obstetrician or midwife as directed:   You will need to follow up with your healthcare provider in 2 to 6 weeks after delivery  Write down your questions so you remember to ask them at your visits  © 2017 [employer] Information is for End User's use only and may not be sold, redistributed or otherwise used for commercial purposes  All illustrations and images included in CareNotes® are the copyrighted property of A D A M , Inc  or Matt Zapata  The above information is an  only  It is not intended as medical advice for individual conditions or treatments  Talk to your doctor, nurse or pharmacist before following any medical regimen to see if it is safe and effective for you

## 2019-12-07 NOTE — OB LABOR/OXYTOCIN SAFETY PROGRESS
Labor Progress Note - Sumaya Fernandezms 22 y o  female MRN: [id number]    Unit/Bed#: L&D 323-01 Encounter: [id number]    Dose (lizbeth-units/min) Oxytocin: 0 lizbeth-units/min  Contraction Frequency (minutes): 1-4  Contraction Quality: Mild  Tachysystole: No   Dilation: 7-8        Effacement (%): 100  Station: -1  Baseline Rate: 130 bpm  Fetal Heart Rate: 145 BPM  FHR Category: Category I             Notes/comments:   Pt feeling constant pressure  SVE noted above  Making excellent cervical change s/p AROM  Continue expectant management  FHT with intermittent decelerations, which return to baseline and category I with moderate variability       Vaishali Alegria MD 12/7/2019 6:53 AM

## 2019-12-07 NOTE — ANESTHESIA PREPROCEDURE EVALUATION
Review of Systems/Medical History  Patient summary reviewed        Cardiovascular  Negative cardio ROS    Pulmonary  Negative pulmonary ROS        GI/Hepatic  Negative GI/hepatic ROS          Negative  ROS        Endo/Other  Negative endo/other ROS      GYN  Currently pregnant , Prior pregnancy/OB history : 3 Parity: 2,          Hematology  Anemia ,    Comment: Hgb 9 8 Musculoskeletal  Negative musculoskeletal ROS        Neurology  Negative neurology ROS      Psychology   Negative psychology ROS              Physical Exam    Airway    Mallampati score: II         Dental   No notable dental hx     Cardiovascular  Comment: Negative ROS, Rhythm: regular, Rate: normal, Cardiovascular exam normal    Pulmonary  Pulmonary exam normal     Other Findings        Anesthesia Plan  ASA Score- 2     Anesthesia Type- epidural with ASA Monitors  Additional Monitors:   Airway Plan:         Plan Factors-    Induction-     Postoperative Plan-     Informed Consent- Anesthetic plan and risks discussed with patient

## 2019-12-07 NOTE — ANESTHESIA PROCEDURE NOTES
Chief Complaint   Patient presents with    Red Eye     Right eye x 1 day   Patient is here today for Right eye redness x 1 day. Patient sts she has redness in both eye with drainage. Patient sts she has swelling in her legs due to amilopine. 1. Have you been to the ER, urgent care clinic since your last visit? Hospitalized since your last visit? No    2. Have you seen or consulted any other health care providers outside of the [address] [place] [address] since your last visit? Include any pap smears or colon screening. Yes [address]. Epidural Block    Start time: 12/6/2019 10:06 PM  Reason for block: at surgeon's request and primary anesthetic  Staffing  Anesthesiologist: Apryl Mcgarry DO  Preanesthetic Checklist  Completed: patient identified, site marked, surgical consent, pre-op evaluation, timeout performed, IV checked, risks and benefits discussed and monitors and equipment checked  Epidural  Patient position: sitting  Prep: Betadine  Patient monitoring: heart rate and frequent blood pressure checks  Approach: midline  Location: lumbar (1-5)  Injection technique: BETTE air  Needle  Needle type: Tuohy   Needle gauge: 18 G  Catheter type: side hole  Catheter size: 20 G  Catheter at skin depth: 10 cmnegative aspiration for CSF, negative aspiration for heme and no paresthesia on injection  patient tolerated the procedure well with no immediate complications

## 2019-12-07 NOTE — OB LABOR/OXYTOCIN SAFETY PROGRESS
Oxytocin Safety Progress Check Note - Tyrell La 22 y o  female MRN: [id number]    Unit/Bed#: L&D 323-01 Encounter: [id number]    Dose (lizbeth-units/min) Oxytocin: 0 lizbeth-units/min  Contraction Frequency (minutes): 1-4  Contraction Quality: Mild  Tachysystole: No   Dilation: deferred  Effacement (%): deferred  Station: deferred  Baseline Rate: 130 bpm  Fetal Heart Rate: 145 BPM  FHR Category: Category I             Notes/comments:   Late Entry due to Acute pateint care  FHT is category 1  Patient is doing well  Due to acuity of floor, will not start pitocin until OR is available for emergency  Dr Sera Wong aware      Sabine Gary MD 12/7/2019 3:30 AM

## 2019-12-07 NOTE — OB LABOR/OXYTOCIN SAFETY PROGRESS
Labor Progress Note - Claudell Downer 22 y o  female MRN: [id number]    Unit/Bed#: L&D 323-01 Encounter: [id number]    Dose (lizbeth-units/min) Oxytocin: 0 lizbeth-units/min  Contraction Frequency (minutes): 1-4  Contraction Quality: Mild  Tachysystole: No   Dilation: 6        Effacement (%): 90  Station: -2  Baseline Rate: 130 bpm  Fetal Heart Rate: 145 BPM  FHR Category: Category II             Notes/comments:   FHT noted to have late deceleration to the 90s with moderate variability  Returned to baseline  Notified by nursing of moderate blood show  Pt comfortable with epidural  Does not report pressure  SVE noted above  Fetal head well engaged, AROM for clear fluid at 0637  Continue expectant management       Zuleyka Burton MD 12/7/2019 6:39 AM

## 2019-12-07 NOTE — PROGRESS NOTES
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 which made it difficult for her to walk  She states she would only go somewhere, such as [facility] to be able use the motorized scooter  Pt states she limited her ambulation otherwise  However, patient states that this is new to her  She denies any other pain, numbness/tingling, or other deficits  Denies any issues with gait, besides difficulty with lifting feet when walking  On exam, patient with pubic symphysis tenderness and of medial thighs  Most-likely MSK based on clinical picture secondary to positioning during delivery, however, will r/o pubic symphysis separation with Xray  Will provide patient with walker  Pt to ambulate with assistance  Of note, patient delivered a 9lb  at 0803 today       Vitals:    19 1500   BP: 104/60   Pulse: 70   Resp: 18   Temp: 98 °F (36 7 °C)         Marya Torres MD  OBGYN, PGY-3  2019 6:20 PM

## 2019-12-07 NOTE — OB LABOR/OXYTOCIN SAFETY PROGRESS
Oxytocin Safety Progress Check Note - Snehal Duque 22 y o  female MRN: [id number]    Unit/Bed#: L&D 323-01 Encounter: [id number]    Dose (lizbeth-units/min) Oxytocin: 0 lizbeth-units/min  Contraction Frequency (minutes): 1-4  Contraction Quality: Mild  Tachysystole: No   Dilation: 5       Effacement (%): 70  Station: -2  Baseline Rate: 130 bpm  Fetal Heart Rate: 145 BPM  FHR Category: Category II             Notes/comments:     Late Entry due to Patient's Care     Patient had a prolonged deceleration for 6 minutes to the 70s, which recovered with repositioning, IV fluid, oxygen, and turning pitocin off  She made change from 4cm to 5cm  Will obtain 30 minutes of category 1 FHT before restarting her on pitocin  Dr Manny Nesbitt is aware      Suhas Ragsdale MD 12/7/2019 1:15 AM

## 2019-12-07 NOTE — OB LABOR/OXYTOCIN SAFETY PROGRESS
Labor Progress Note - Moise Doran 22 y o  female MRN: [id number]    Unit/Bed#: L&D 323-01 Encounter: [id number]    Dose (lizbeth-units/min) Oxytocin: 0 lizbeth-units/min  Contraction Frequency (minutes): 1-4  Contraction Quality: Mild  Tachysystole: No   Dilation: Lip/rim (Comment)        Effacement (%): 100  Station: 1  Baseline Rate: 130 bpm  Fetal Heart Rate: 145 BPM  FHR Category: Category I             Notes/comments:   Pt feeling urge to push  Noted be anterior lip, unable to be reduced   Will continue expectant management    Darline Yu MD 12/7/2019 7:15 AM

## 2019-12-07 NOTE — OB LABOR/OXYTOCIN SAFETY PROGRESS
Oxytocin Safety Progress Check Note - Marilee Avilez 22 y o  female MRN: [id number]    Unit/Bed#: L&D 323-01 Encounter: [id number]    Dose (lizbeth-units/min) Oxytocin: 0 lizbeth-units/min  Contraction Frequency (minutes): 1-4  Contraction Quality: Mild  Tachysystole: No   Dilation: 5-6       Effacement (%): 80  Station: -2  Baseline Rate: 130 bpm  Fetal Heart Rate: 145 BPM  FHR Category: Category II             Notes/comments:     Late Entry due to Patient's Care    Patient had another prolonged deceleration for 7 minutes that went to the 70s  This resolved with repositioning, fluid, oxygen  Patient has made some cervical change  Will consider pitocin if have 30 minutes of category 1 FHT  Patient is tess every 2-4 minutes  Dr Romaine Barthel aware      Kasandra Lucero MD 12/7/2019 5:15 AM

## 2019-12-07 NOTE — L&D DELIVERY NOTE
DELIVERY NOTE  Phillip Ruiz 22 y o  female MRN: [id number]  Unit/Bed#: L&D 323-01 Encounter: [id number]    Obstetrician:   Dr Angelica Orourke    Assistant: Dr Kelly Sanchez    Pre-Delivery Diagnosis: Term pregnancy at 39w4d  Single fetus  Elective IOL  Rh negative  Anemia requiring IV iron  Prior pregnancy with lumbosacral NTD with fetoscopic repair at 27wk followed by PPROM and delivery 8 days after    Post-Delivery Diagnosis: Same as above - Delivered  2nd degree laceration  Terminal meconium     Procedure: Spontaneous vaginal delivery  Repair of 2nd degree spontaneous laceration      Quantitative Blood Loss:  217mL    Cord Blood Gases:   Arterial pH:7 201, BE: -4 2  Venous pH: 7 393, BE: -1 3    Complications:  None    Brief Hospital Course: Phillip Ruiz is a 22 y o  female [id number] at 39w4d that was initially admitted for eIOL  She was noted to be 4/70/-2 and received an epidural for anesthesia  Pitocin was initiated, however, due to two prolonged decelerations, was subsequently discontinued  FHT returned to category I, but was notable for intermittent late and variable decelerations during her labor course with moderate variability and positive accelerations  Patient was managed expectantly and was noted to go into spontaneous labor  She progressed to 5-6/90/-2 when amniotomy was able to be successfully performed for clear fluid at 0637  Pt continued to make excellent cervical change and progressed to complete dilation without complication  At that time, meconium-stained fluid was appreciated  NICU was notified to be present for delivery  Description of Delivery:   During maternal pushing efforts, perineal massage was performed and warm compresses applied  After pushing for 5 minutes, the patient delivered a viable Male  over a 2nd degree laceration at 0803, APGARs 9/9, weight pending  The fetal head delivered DARNELL and restituted to LOT  A nuchal cord was noted x1, lose and reduced at the perineum   With the assistance of maternal expulsive efforts and downward traction of fetal head, the right anterior shoulder was delivered without difficulty, followed by the remainder of the infant's body  The  was placed on the mother's chest and provided routine care by the  staff  There was delayed clamping of the umbilical cord, after which, was doubly clamped and cut  Umbilical cord blood and umbilical artery and venous gases were collected  Placenta was delivered with fundal massage and gentle traction on the cord with active management of the third stage of labor  Placenta delivered intact with a 3-vessel cord at 0806  Active management of the third stage of labor was undertaken with IV pitocin  Bleeding was noted to be under control  Inspection of the perineum, vagina, labia, and urethra revealed a 2nd degree which was then repaired in standard fashion with 3-0 Vicryl rapide  The lacerations showed good tissue reapproximation and hemostasis  Mother and baby are currently recovering nicely in stable condition  At the conclusion of the procedure, all needle, sponge, and instrument counts were noted to be correct x2  Dr Brittni Sommer was present and participated in the entire delivery      Jarocho Armenta MD  OBGYN, PGY-3  2019 8:36 AM

## 2019-12-07 NOTE — H&P
H&P Exam - Obstetrics   Sandra Gonzalez 22 y o  female MRN: [id number]  Unit/Bed#: L&D 323-01 Encounter: [id number]      History of Present Illness     Chief Complaint: Induction of labor    HPI:  Sandra Gonzalez is a 22 y o  [id number] female with an DANIELLE of 12/10/2019, by Last Menstrual Period at 39w3d weeks gestation who is being admitted for induction of labor  Contractions: yes  Vaginal Bleeding: no   Loss of Fluid: no  Fetal Movement: yes    PREGNANCY COMPLICATIONS:  Rh negative, HSV Infection on valtrex, short interval pregnancy, anemia, prior pregnancy with lumbosacral open neural tube defect with fetoscopic repair leading to PPROM and delivery 8 days later    OB History    Para Term  AB Living   3 2 1 1   2   SAB TAB Ectopic Multiple Live Births           2      # Outcome Date GA Lbr Narendra/2nd Weight Sex Delivery Anes PTL Lv   3 Current            2  19 27w0d  970 g (2 lb 2 2 oz) F Vag-Spont   MERRICK   1 Term 17 41w0d  3600 g (7 lb 15 oz) M Vag-Spont EPI N MERRICK       Baby complications/comments:   G2 pregnancy was complicated by spina bifida for which she went an operative fetal surgery  She had a laparotomy and then a laparoscopy of the uterus  She delivered 8 days after the surgery at 27 weeks    Review of Systems   Constitutional: Negative for chills and fever  Eyes: Negative for visual disturbance  Respiratory: Negative for shortness of breath  Cardiovascular: Negative for chest pain  Gastrointestinal: Negative for constipation, diarrhea, nausea and vomiting  Genitourinary: Negative for pelvic pain, urgency, vaginal bleeding, vaginal discharge and vaginal pain  Neurological: Negative for headaches         Historical Information   Past Medical History:   Diagnosis Date    Herpes simplex     outbreak 1 year ago     Past Surgical History:   Procedure Laterality Date    FETAL SURGERY      OTHER SURGICAL HISTORY      fetoscopy at [facility] in 2019     Social History   Social History     Substance and Sexual Activity   Alcohol Use No     Social History     Substance and Sexual Activity   Drug Use No     Social History     Tobacco Use   Smoking Status Never Smoker   Smokeless Tobacco Never Used     Family History:   Family History   Problem Relation Age of Onset    Fibromyalgia Mother     Asthma Mother     Irritable bowel syndrome Mother     Depression Father     Autism Sister     No Known Problems Brother     No Known Problems Son     Diabetes Maternal Grandmother     Cancer Maternal Grandmother     No Known Problems Paternal Grandmother     No Known Problems Paternal Grandfather        Meds/Allergies    {  Medications Prior to Admission   Medication    valACYclovir (VALTREX) 500 mg tablet    ferrous sulfate 324 (65 Fe) mg    folic acid (FOLVITE) 1 mg tablet    Prenatal MV-Min-Fe Fum-FA-DHA (PRENATAL 1 PO)      No Known Allergies    OBJECTIVE:    Vitals:   BP 91/53   Pulse 88   Temp 98 4 °F (36 9 °C) (Oral)   Resp 18   Ht 5' 3" (1 6 m)   Wt 68 kg (150 lb)   LMP 03/05/2019 (Exact Date)   Breastfeeding? No   BMI 26 57 kg/m²   Body mass index is 26 57 kg/m²  Physical Exam   Constitutional: She is oriented to person, place, and time  She appears well-developed and well-nourished  Neck: No thyromegaly present  Cardiovascular: Normal rate, regular rhythm and normal heart sounds  Exam reveals no gallop and no friction rub  No murmur heard  Pulmonary/Chest: Effort normal and breath sounds normal    Genitourinary:   Genitourinary Comments: No lesions seeing, hx of HSV   Musculoskeletal: She exhibits no edema  Neurological: She is alert and oriented to person, place, and time  Skin: Skin is warm and dry  Psychiatric: She has a normal mood and affect   Her behavior is normal  Thought content normal      SVE: Dilation: 4  Effacement (%): 70  Station: -2  Presentation: Vertex  Position: Unknown  Method: Manual  OB Examiner: Sandhya Salts:  Baseline Rate: 130 bpm  Variability: Moderate 6-25 bpm  Accelerations: 15 x 15 or greater  Decelerations: None   TOCO: irritable  Contraction Frequency (minutes): 1-4  Contraction Duration (seconds): 40-80  Contraction Quality: Mild      Prenatal Labs:   Blood type: O neg  Antigen Screen: negative  Rubella: Immune  HIV: Negative  RPR: NR  Hep B: negative  Diabetes Screen: normal 1 hour  GBS: negative      Invasive Devices     Peripheral Intravenous Line            Peripheral IV 18 Left Antecubital 366 days    Peripheral IV 19 Left;Ventral (anterior) Forearm less than 1 day          Epidural Line            Epidural Catheter 19 less than 1 day          Drain            Urethral Catheter Non-latex 16 Fr  less than 1 day                Assessment/Plan     ASSESSMENT:   IUP at 39w3d weeks gestation here for elective IOL  PLAN:   1) Admit   2) CBC, RPR, Blood Type   3) Epidural analgesia requested   4) Anticipate    5) Will start pitocin titration and reassess in two hours or earlier as needed  6) D/w Dr Jaimie Jiang      This patient will be an INPATIENT  and I certify the anticipated length of stay is >2 Midnights        Velvet Lazar MD  2019  6:50 AM

## 2019-12-08 PROCEDURE — 99024 POSTOP FOLLOW-UP VISIT: CPT | Performed by: OBSTETRICS & GYNECOLOGY

## 2019-12-08 RX ADMIN — VALACYCLOVIR HYDROCHLORIDE 500 MG: 500 TABLET, FILM COATED ORAL at 08:10

## 2019-12-08 RX ADMIN — FERROUS SULFATE TAB 325 MG (65 MG ELEMENTAL FE) 325 MG: 325 (65 FE) TAB at 07:11

## 2019-12-08 RX ADMIN — IBUPROFEN 600 MG: 600 TABLET ORAL at 07:11

## 2019-12-08 RX ADMIN — IBUPROFEN 600 MG: 600 TABLET ORAL at 19:57

## 2019-12-08 RX ADMIN — DOCUSATE SODIUM 100 MG: 100 CAPSULE, LIQUID FILLED ORAL at 08:10

## 2019-12-08 RX ADMIN — DOCUSATE SODIUM 100 MG: 100 CAPSULE, LIQUID FILLED ORAL at 18:51

## 2019-12-08 RX ADMIN — VALACYCLOVIR HYDROCHLORIDE 500 MG: 500 TABLET, FILM COATED ORAL at 18:51

## 2019-12-08 RX ADMIN — PRENATAL VIT W/ FE FUMARATE-FA TAB 27-0.8 MG 1 TABLET: 27-0.8 TAB at 08:10

## 2019-12-08 NOTE — PROGRESS NOTES
Review of pelvic Xray with Dr Tyra White demonstrates 17-18mm separation of pubic symphysis  Pt will need conservative management for 4-6 wks  Will place order for PT evaluation       Ubaldo Falcon MD  OBGYN, PGY-3  12/7/2019 7:07 PM

## 2019-12-08 NOTE — PROGRESS NOTES
Progress Note - OB/GYN   Jose Landin 22 y o  female MRN: [id number]  Unit/Bed#: L&D 307-01 Encounter: [id number]    Assessment:  Post partum Day #1 s/p , stable    Plan:  Continue routine post partum care  Encourage ambulation  Rh negative: RHIG/RhoGAM ordered  Anemia s/p IV iron infusions: Hgb 9 8 on admission  Pubic symphysis separation: Pt with pelvic girdle pain and difficulty walking yesterday, evaluated with Xray that demonstrated 17-18mm separation  Plan for conservative management  F/u PT eval  Encouraged ambulation with walker- patient notes improvement in symptoms  Dispo: Stable, anticipate discharge home tomorrow    Subjective/Objective   Chief Complaint:     Post delivery    Subjective:   Pt reports improvement in bilateral thigh pain  She also has been ambulating with walker and notes improvement with ability to lift feet  Denies numbness/tingling  Pain: yes, cramping, improved with meds  Tolerating PO: yes  Voiding: yes  Flatus: yes  BM: no  Ambulating: yes  Breastfeeding: no  Chest pain: no  Shortness of breath: no  Leg pain: no  Lochia: minimal    Objective:     Vitals: /64 (BP Location: Right arm)   Pulse 63   Temp 97 8 °F (36 6 °C) (Oral)   Resp 16   Ht 5' 3" (1 6 m)   Wt 68 kg (150 lb)   LMP 2019 (Exact Date)   Breastfeeding?  No   BMI 26 57 kg/m²       Intake/Output Summary (Last 24 hours) at 2019 0539  Last data filed at 2019 1801  Gross per 24 hour   Intake 1096 4 ml   Output 1367 ml   Net -270 6 ml       Lab Results   Component Value Date    WBC 7 63 2019    HGB 9 8 (L) 2019    HCT 32 4 (L) 2019    MCV 84 2019     2019       Meds/Allergies   Current Facility-Administered Medications   Medication Dose Route Frequency    acetaminophen (TYLENOL) tablet 650 mg  650 mg Oral Q6H PRN    benzocaine-menthol-lanolin-aloe (DERMOPLAST) 20-0 5 % topical spray   Topical 4x Daily PRN    calcium carbonate (TUMS) chewable tablet 1,000 mg  1,000 mg Oral Daily PRN    diphenhydrAMINE (BENADRYL) tablet 25 mg  25 mg Oral Q6H PRN    docusate sodium (COLACE) capsule 100 mg  100 mg Oral BID    ferrous sulfate tablet 325 mg  325 mg Oral Daily With Breakfast    hydrocortisone 1 % cream 1 application  1 application Topical PRN    ibuprofen (MOTRIN) tablet 600 mg  600 mg Oral Q6H PRN    ondansetron (ZOFRAN) injection 4 mg  4 mg Intravenous Q8H PRN    oxyCODONE (ROXICODONE) IR tablet 5 mg  5 mg Oral Q4H PRN    oxytocin (PITOCIN) 30 Units in lactated ringers 500 mL infusion  250 lizbeth-units/min Intravenous Continuous    prenatal multivitamin tablet 1 tablet  1 tablet Oral Daily    Rho(D) immune globulin (RHOGAM ULTRA-FILTERED PLUS) IM injection 300 mcg  300 mcg Intramuscular PRN    simethicone (MYLICON) chewable tablet 80 mg  80 mg Oral 4x Daily PRN    valACYclovir (VALTREX) tablet 500 mg  500 mg Oral BID    witch hazel-glycerin (TUCKS) topical pad 1 pad  1 pad Topical PRN       Physical Exam:     Gen: AAOx3, NAD  CV: RRR  Lungs: CTA b/l  Abd: Soft, non-tender, non-distended, no rebound or guarding  Uterine fundus firm and non-tender,  at the umbilicus   Ext: Non tender      Ubaldo Falcon MD  OBGYN, PGY-3  12/8/2019 5:42 AM

## 2019-12-09 ENCOUNTER — HOSPITAL ENCOUNTER (OUTPATIENT)
Dept: INFUSION CENTER | Facility: HOSPITAL | Age: 24
Discharge: HOME | End: 2019-12-09

## 2019-12-09 VITALS
BODY MASS INDEX: 26.58 KG/M2 | SYSTOLIC BLOOD PRESSURE: 102 MMHG | HEIGHT: 63 IN | TEMPERATURE: 98.7 F | WEIGHT: 150 LBS | HEART RATE: 84 BPM | DIASTOLIC BLOOD PRESSURE: 69 MMHG | RESPIRATION RATE: 18 BRPM

## 2019-12-09 LAB — RPR SER QL: NORMAL

## 2019-12-09 PROCEDURE — 99024 POSTOP FOLLOW-UP VISIT: CPT | Performed by: OBSTETRICS & GYNECOLOGY

## 2019-12-09 PROCEDURE — 97163 PT EVAL HIGH COMPLEX 45 MIN: CPT

## 2019-12-09 PROCEDURE — 97535 SELF CARE MNGMENT TRAINING: CPT

## 2019-12-09 RX ORDER — LIDOCAINE HYDROCHLORIDE AND EPINEPHRINE 10; 10 MG/ML; UG/ML
10 INJECTION, SOLUTION INFILTRATION; PERINEURAL ONCE
Status: COMPLETED | OUTPATIENT
Start: 2019-12-09 | End: 2019-12-09

## 2019-12-09 RX ADMIN — DOCUSATE SODIUM 100 MG: 100 CAPSULE, LIQUID FILLED ORAL at 08:55

## 2019-12-09 RX ADMIN — LIDOCAINE HYDROCHLORIDE,EPINEPHRINE BITARTRATE 10 ML: 10; .01 INJECTION, SOLUTION INFILTRATION; PERINEURAL at 11:22

## 2019-12-09 RX ADMIN — FERROUS SULFATE TAB 325 MG (65 MG ELEMENTAL FE) 325 MG: 325 (65 FE) TAB at 10:30

## 2019-12-09 RX ADMIN — PRENATAL VIT W/ FE FUMARATE-FA TAB 27-0.8 MG 1 TABLET: 27-0.8 TAB at 08:55

## 2019-12-09 RX ADMIN — ETONOGESTREL 68 MG: 68 IMPLANT SUBCUTANEOUS at 11:22

## 2019-12-09 RX ADMIN — VALACYCLOVIR HYDROCHLORIDE 500 MG: 500 TABLET, FILM COATED ORAL at 08:55

## 2019-12-09 NOTE — PHYSICAL THERAPY NOTE
Physical Therapy Evaluation [id number]     Patient Name: Alvaro Garcia    [id number] Date: 2019     Problem List  Principal Problem:     (spontaneous vaginal delivery)  Active Problems:    HSV infection    Previous child with congenital anomaly, currently pregnant, antepartum    Short interval between pregnancies complicating pregnancy, antepartum, third trimester    Anemia during pregnancy in third trimester    39 weeks gestation of pregnancy       Past Medical History  Past Medical History:   Diagnosis Date    Herpes simplex     outbreak 1 year ago        Past Surgical History  Past Surgical History:   Procedure Laterality Date    FETAL SURGERY      OTHER SURGICAL HISTORY      fetoscopy at [facility] in 19 1028   Note Type   Note type Eval/Treat   Pain Assessment   Pain Assessment No/denies pain   Pain Score 3   Pain Type Acute pain   Pain Location Pelvis   Pain Orientation Anterior   Hospital Pain Intervention(s) Repositioned; Emotional support; Ambulation/increased activity   Response to Interventions tolerated    Home Living   Type of [address] [facility] Multi-level;1/2 bath on main level;Bed/bath upstairs;Stairs to enter with rails   Home Equipment   (none )   Additional Comments lives w/ SO and grandmother - will have support on d/c to assist    Prior Function   Level of [place] Independent with ADLs and functional mobility   Lives With Significant other;Family   Receives Help From Family   ADL Assistance Independent   IADLs Independent   Falls in the last 6 months 0   Comments pt reports I w/o AD PTA- I w/ ADL's - reports excellent support from family for d/c    Restrictions/Precautions   Weight Bearing Precautions Per Order No   Other Precautions Pain  (ATTEMPT TO KEEP KNEES TOGETHER FOR COMFORT )   General   Additional Pertinent History pt is s/p  vaginal delivery of ~9 lb baby boy  w/ PT consulted for dx of pubic symphasis separation    Family/Caregiver Present No   Cognition   Overall Cognitive Status WFL   Orientation Level Oriented X4   Memory Within functional limits   Following Commands Follows all commands and directions without difficulty   Comments pt is pleasant and appropriate throughout- tearful on initation 2* son being transferred to NICU- but agreeable to PT consult and education-    RUE Assessment   RUE Assessment WFL   LUE Assessment   LUE Assessment WFL   RLE Assessment   RLE Assessment WFL   LLE Assessment   LLE Assessment WFL   Coordination   Movements are Fluid and Coordinated   (slow antalgic )   Light Touch   RLE Light Touch Grossly intact   LLE Light Touch Grossly intact   Bed Mobility   Rolling R 7  Independent   Rolling L 7  Independent   Supine to Sit 5  Supervision  (PT providing cues for technique and maintaining adduction )   Sit to Supine 5  Supervision   Additional items Increased time required;Verbal cues   Transfers   Sit to Stand 5  Supervision  (> independent )   Stand to Sit 5  Supervision  (progressing to indep )   Stand pivot 5  Supervision   Toilet transfer 5  Supervision   Additional Comments all mobility w/o AD- pt reports "not needing" RW and "not wanting RW at tthis time- PT provided education in RW use and offered for d/c home- pt declining at this time stating her pain is much more controlled  see tx notes below    Ambulation/Elevation   Gait pattern Excessively slow; Short stride;Decreased foot clearance; Antalgic   Gait Assistance 5  Supervision   Additional items Verbal cues  (pt education on taking shorter steps and use of abd binder)   Assistive Device None  (pt declines )   Distance 50' in room w/o AD- PT educated in use of RW which pt declines    Stair Management Assistance 5  Supervision   Additional items Verbal cues   Stair Management Technique Sideways  (educated in sideways technique to minimize pubic separation )   Endurance Deficit   Endurance Deficit Yes Endurance Deficit Description pain and fatigue limiting    Activity Tolerance   Activity Tolerance Patient limited by pain  (tearful re; son being xferred to NICU )   Medical Staff Made Aware yes- RN and CM (carlos) updated   Nurse Made Aware yes-    Assessment   Prognosis Good   Problem List Decreased range of motion;Decreased mobility;Pain   Assessment Pt is 22 y o  female seen for PT evaluation s/p admit to [facility]OS[facility] on 12/6/2019  PT consulted following vaginal delivery of a 9 lb baby boy on 12/7/ 19  Pt noted to have pubic symphysis separation ( xray confirmed 17-18mm separation)  - conservative management  Comorbidities affecting pt's physical performance at time of assessment listed above  Personal factors affecting pt at time of IE include: steps to enter environment, pain; limited mobility  Prior to admission, pt was living w/ SO and grandmother in a multistory home w/ main bed and bath   Upon evaluation, pt currently is requiring S>Indep A for bed skills; S>indep for functional transfers and S>Indep for ambulation w/o AD- PT provided education/ recommendation on use of RW however pt declining at this time and stating she is not having any increased pain- Pt provided additional tx session and pt education as documented below  Pt presents functioning currently w/ overall mobility deficits 2* to: pain;  decreased LE strength/AROM decreased endurance; decreased activity tolerance; fatigue; (Please find additional objective findings from PT assessment regarding body systems outlined above ) Pt will continue to benefit from skilled PT interventions to address stated impairments; to maximize functional potential; for ongoing pt/ family training; and DME needs  Pt see for 1x tx session as documented below  Pt/ family agreeable to plan and goals as stated on evaluation        Barriers to Discharge None   Goals   Patient Goals have my son be okay"    STG Expiration Date 12/10/19   Short Term Goal #1 1x tx session: pt will demonstrate safe techniques w/ all functional mobility including stairs to minimize pubic symphysis separation  PT Treatment Day 0   Plan   Treatment/Interventions Spoke to case management;Spoke to nursing; Patient/family training   PT Frequency One time visit  (see for 1x tx session follow eval (see notes below) )   Recommendation   Recommendation Outpatient PT  (Women's health facility )   Equipment Recommended   (pt declining RW for d/c )   PT - OK to Discharge Yes   Additional Comments pt up and moving about in room w/o LOB or significant difficulty    Modified Douglas Scale   Modified Douglas Scale 2   Barthel Index   Feeding 10   Bathing 5   Grooming Score 5   Dressing Score 10   Bladder Score 5   Bowels Score 10   Toilet Use Score 10   Transfers (Bed/Chair) Score 10   Mobility (Level Surface) Score 10   Stairs Score 5   Barthel Index Score 80          PHYSICAL THERAPY TREATMENT 1008- 1027    SUBJECT:   "I just want my son to be okay"     OBJECTIVE:  PT PROVIDED ADDITIONAL TX SESSION FOLLOWING EVAL FOR COMPLETION OF STAIR TRAINING SIMULATION AS WELL AS FOR PT EDUCATION ON PROPER POSITIONING TO MINIMIZE PUBIC SYMPHYSIS SEPARATION  PT EDUCATED PT ON MAINTAINING ADDUCED LE POSITIONING DURING BED SKILLS; ROLLING AND SUPINE<> SIT TRANSITIONS  ;USE OF PILLOW B/T KNEES FOR SLEEPING AND NEGOTIATING STEPS SIDEWAYS UP AND DOWN  EDUCATION ON RW USE W/ PATIENT DECLINING STATING HER PAIN "IS NOT THAT BAD TODAY  USE OF ABDOMINAL BINDER IN A LOWER POSITION AROUND PELVIS FOR COMFORT  EDUCATED PT IN SIMPLE TRANSVERSE ABD EXERCISE (BELLYBUTTON TO SPINE)  ASSESSMENT:  PT WAS EXTREMELY GRATEFUL FOR EDUCATION AND "TIPS"  PT WAS ABLE TO DEMO PROPER TECHNIQUES DURING BED SKILLS TRANSFERS AND GAIT HOUSEHOLD DISTANCES PT TOLERATED WELL W/O INCREASE IN PAIN (3/10)  PT DECLINING USE OF RW AT THIS TIME- AGREEABLE TO RX FOR OUTPT PT ([phone]) FOLLOWING EDUCATION AND DISCUSSION RE; SAME       PLAN: D/C PT - RX FOR OUTPT PT ([facility]) DENIES RW- RECOMMEND ONGOING USE OF ABDOMINAL BINDER

## 2019-12-09 NOTE — PROGRESS NOTES
Progress Note - OB/GYN   Mere Cisneros 22 y o  female MRN: [id number]  Unit/Bed#: L&D 307-01 Encounter: [id number]    Assessment:  PPD#2 s/p Spontaneous Vaginal Delivery, improving    Plan:  1  Post partum   - Continue routine post partum care  - Encourage ambulation  - Encourage breastfeeding  - Rh negative: needs Rhogam   - Anemia: Hgb 9 8 on admission, s/p IV venofer     2  HSV  - Valtrex    3  Pubic symphysis separation   - Xray showing 17-18 mm separation  - Conservative management: PT evaluation, walk with walker    4  Discharge planning  - Vaccines: none  - Contraception: Nexplanon - ordered, will place today   - Anticipate discharge today        Subjective/Objective   Chief Complaint:   PPD#2 s/p Spontaneous Vaginal Delivery  Patient doing well  Lochia WNL  Pain well controlled  Subjective: This morning, she has no major complaints  She reports being able to ambulate much more yesterday, even without the walker  Overall she feels good about going home today  Pain: some, responding to PO meds  Tolerating PO: yes  Voiding: yes  Flatus: yes  Bm: no  Ambulating: yes, was able to ambulate more overnight without walker  Breastfeeding: no, bottlefeeding  Chest pain: no  Shortness of breath: no  Leg pain: no  Lochia: WNL    Objective:     Vitals: /66 (BP Location: Right arm)   Pulse 64   Temp 98 3 °F (36 8 °C) (Oral)   Resp 18   Ht 5' 3" (1 6 m)   Wt 68 kg (150 lb)   LMP 03/05/2019 (Exact Date)   Breastfeeding?  No   BMI 26 57 kg/m²     I/O       12/07 0701 - 12/08 0700 12/08 0701 - 12/09 0700    I V  (mL/kg) 1009 6 (14 8)     Total Intake(mL/kg) 1009 6 (14 8)     Urine (mL/kg/hr) 1150 (0 7)     Blood 217     Total Output 1367     Net -357 4                 Lab Results   Component Value Date    WBC 7 63 12/06/2019    HGB 9 8 (L) 12/06/2019    HCT 32 4 (L) 12/06/2019    MCV 84 12/06/2019     12/06/2019       Physical Exam:   Physical Exam   Constitutional: She is oriented to person, place, and time  She appears well-developed and well-nourished  No distress  HENT:   Head: Normocephalic and atraumatic  Eyes: Conjunctivae are normal    Cardiovascular: Normal rate, regular rhythm and normal heart sounds  Pulmonary/Chest: Effort normal and breath sounds normal    Abdominal: Soft  She exhibits no distension  There is no tenderness  Uterus firm below umbilicus  Musculoskeletal: Normal range of motion  5/5 hip flexion, extension, adduction, internal/external rotation  Mild tenderness over pubic symphysis  Neurological: She is alert and oriented to person, place, and time  Skin: Skin is warm and dry         Korey Walker MD  PGY-1, OB/GYN  12/9/2019 6:10 AM

## 2019-12-09 NOTE — PLAN OF CARE
Problem: PAIN - ADULT  Goal: Verbalizes/displays adequate comfort level or baseline comfort level  Description  Interventions:  - Encourage patient to monitor pain and request assistance  - Assess pain using appropriate pain scale  - Administer analgesics based on type and severity of pain and evaluate response  - Implement non-pharmacological measures as appropriate and evaluate response  - Consider cultural and social influences on pain and pain management  - Notify physician/advanced practitioner if interventions unsuccessful or patient reports new pain  Outcome: Progressing     Problem: INFECTION - ADULT  Goal: Absence or prevention of progression during hospitalization  Description  INTERVENTIONS:  - Assess and monitor for signs and symptoms of infection  - Monitor lab/diagnostic results  - Monitor all insertion sites, i e  indwelling lines, tubes, and drains  - Monitor endotracheal if appropriate and nasal secretions for changes in amount and color  - [place] appropriate cooling/warming therapies per order  - Administer medications as ordered  - Instruct and encourage patient and family to use good hand hygiene technique  - Identify and instruct in appropriate isolation precautions for identified infection/condition  Outcome: Progressing  Goal: Absence of fever/infection during neutropenic period  Description  INTERVENTIONS:  - Monitor WBC    Outcome: Progressing     Problem: SAFETY ADULT  Goal: Patient will remain free of falls  Description  INTERVENTIONS:  - Assess patient frequently for physical needs  -  Identify cognitive and physical deficits and behaviors that affect risk of falls    -  [place] fall precautions as indicated by assessment   - Educate patient/family on patient safety including physical limitations  - Instruct patient to call for assistance with activity based on assessment  - Modify environment to reduce risk of injury  - Consider OT/PT consult to assist with strengthening/mobility  Outcome: Progressing  Goal: Maintain or return to baseline ADL function  Description  INTERVENTIONS:  -  Assess patient's ability to carry out ADLs; assess patient's baseline for ADL function and identify physical deficits which impact ability to perform ADLs (bathing, care of mouth/teeth, toileting, grooming, dressing, etc )  - Assess/evaluate cause of self-care deficits   - Assess range of motion  - Assess patient's mobility; develop plan if impaired  - Assess patient's need for assistive devices and provide as appropriate  - Encourage maximum independence but intervene and supervise when necessary  - Involve family in performance of ADLs  - Assess for home care needs following discharge   - Consider OT consult to assist with ADL evaluation and planning for discharge  - Provide patient education as appropriate  Outcome: Progressing  Goal: Maintain or return mobility status to optimal level  Description  INTERVENTIONS:  - Assess patient's baseline mobility status (ambulation, transfers, stairs, etc )    - Identify cognitive and physical deficits and behaviors that affect mobility  - Identify mobility aids required to assist with transfers and/or ambulation (gait belt, sit-to-stand, lift, walker, cane, etc )  - [place] fall precautions as indicated by assessment  - Record patient progress and toleration of activity level on Mobility SBAR; progress patient to next Phase/Stage  - Instruct patient to call for assistance with activity based on assessment  - Consider rehabilitation consult to assist with strengthening/weightbearing, etc   Outcome: Progressing     Problem: Knowledge Deficit  Goal: Patient/family/caregiver demonstrates understanding of disease process, treatment plan, medications, and discharge instructions  Description  Complete learning assessment and assess knowledge base    Interventions:  - Provide teaching at level of understanding  - Provide teaching via preferred learning methods  Outcome: Progressing     Problem: DISCHARGE PLANNING  Goal: Discharge to home or other facility with appropriate resources  Description  INTERVENTIONS:  - Identify barriers to discharge w/patient and caregiver  - Arrange for needed discharge resources and transportation as appropriate  - Identify discharge learning needs (meds, wound care, etc )  - Arrange for interpretive services to assist at discharge as needed  - Refer to Case Management Department for coordinating discharge planning if the patient needs post-hospital services based on physician/advanced practitioner order or complex needs related to functional status, cognitive ability, or social support system  Outcome: Progressing

## 2019-12-09 NOTE — SOCIAL WORK
rec from PT that the patient should d/c home with OPPT services for [facility]  CM provided the information to the patient on where to call to set up an appt and requested rx from OBGYN  Home Living   Type of [address] [facility] Multi-level;1/2 bath on main level;Bed/bath upstairs;Stairs to enter with rails   Home Equipment    (none )   Additional Comments lives w/ SO and grandmother - will have support on d/c to assist    Prior Function   Level of [place] Independent with ADLs and functional mobility   Lives With Significant other;Family   Receives Help From Family   ADL Assistance Independent   IADLs Independent   Falls in the last 6 months 0   Comments pt reports I w/o AD PTA- I w/ ADL's - reports excellent support from family for d/c      Infant transferred to NICU  MOB reports she has 10mo at home   She reports having a good support system, lives with family and FOBIngrid  She reports that family is able to assist with transport needs  She has all necessary items for the infant at discharge  No hx of CYS   No drug hx  No hx of incarceration  Plans to use [facility] at discharge  PNC provided through [address] [place] [address]    Will follow for any needs

## 2019-12-09 NOTE — PROCEDURES
Procedure note:    Pre-procedure: Desires long acting contraception  Post-procedure: Same, Nexplanon in place  Procedure: nexplanon insertion  Lot number: [id number]  EXP: 12/9/2022  Physician: Dr Yana Wagoner  Anesthesia: lidocaine 1%, 3mL infiltrated locally     Procedure: The patient is right handed  The left arm was chosen  Consent was explained and signed  The patient was not pregnant; she is postpartum day 2 from spontaneous vaginal delivery  The patient was positioned supine with her arm externally rotated and flexed at the elbow with her hand behind her head  The insertion site was chosen 8-10 cm medial to the medial epicondyle, and 3-5 cm posterior to the sulcus between the bicep and tricep in order to avoid the vascular bundle and ulnar nerve  The skin was cleansed with iodine  ?1% lidocaine was infiltrated at the insertion site  The skin was cleansed again with betadine swabs x 3  The device was removed from its package  The implant was visible within the insertion device  The insertion was performed according to standard procedure: The tip of the  was passed through the skin, the skin was tented up, and the device was advanced until its full length was beneath the skin  The trigger was activated at and the sheath withdrawn  The implant was successfully deployed  The implant was palpable by me  A pressure bandage was placed over the insertion site  The patient tolerated the procedure well      Rola Jerry MD   PGY-1, OBGYN  12/09/19 11:45 AM

## 2019-12-09 NOTE — PLAN OF CARE
Problem: PHYSICAL THERAPY ADULT  Goal: Performs mobility at highest level of function for planned discharge setting  See evaluation for individualized goals  Description  Treatment/Interventions: Spoke to case management, Spoke to nursing, Patient/family training  Equipment Recommended: (pt declining RW for d/c )       See flowsheet documentation for full assessment, interventions and recommendations  12/9/2019 1400 by Lise Davila PT  Outcome: Completed  Note:   Prognosis: Good  Problem List: Decreased range of motion, Decreased mobility, Pain  Assessment: Pt is 22 y o  female seen for PT evaluation s/[place] [facility] [place] [facility]  PT consulted following vaginal delivery of a 9 lb baby boy on 12/7/ 19  Pt noted to have pubic symphysis separation ( xray confirmed 17-18mm separation)  - conservative management  Comorbidities affecting pt's physical performance at time of assessment listed above  Personal factors affecting pt at time of IE include: steps to enter environment, pain; limited mobility  Prior to admission, pt was living w/ SO and grandmother in a multistory home w/ main bed and bath   Upon evaluation, pt currently is requiring S>Indep A for bed skills; S>indep for functional transfers and S>Indep for ambulation w/o AD- PT provided education/ recommendation on use of RW however pt declining at this time and stating she is not having any increased pain- Pt provided additional tx session and pt education as documented below  Pt presents functioning currently w/ overall mobility deficits 2* to: pain;  decreased LE strength/AROM decreased endurance; decreased activity tolerance; fatigue; (Please find additional objective findings from PT assessment regarding body systems outlined above ) Pt will continue to benefit from skilled PT interventions to address stated impairments; to maximize functional potential; for ongoing pt/ family training; and DME needs   Pt see for 1x tx session as documented below  Pt/ family agreeable to plan and goals as stated on evaluation  Barriers to Discharge: None     Recommendation: Outpatient PT(Women's health facility )     PT - OK to Discharge: (S) Yes    See flowsheet documentation for full assessment  12/9/2019 1400 by Lise Davila PT  Note:   Prognosis: Good  Problem List: Decreased range of motion, Decreased mobility, Pain  Assessment: Pt is 22 y o  female seen for PT evaluation s/p admit to [facility] [address] on 12/6/2019  PT consulted following vaginal delivery of a 9 lb baby boy on 12/7/ 19  Pt noted to have pubic symphysis separation ( xray confirmed 17-18mm separation)  - conservative management  Comorbidities affecting pt's physical performance at time of assessment listed above  Personal factors affecting pt at time of IE include: steps to enter environment, pain; limited mobility  Prior to admission, pt was living w/ SO and grandmother in a multistory home w/ main bed and bath   Upon evaluation, pt currently is requiring S>Indep A for bed skills; S>indep for functional transfers and S>Indep for ambulation w/o AD- PT provided education/ recommendation on use of RW however pt declining at this time and stating she is not having any increased pain- Pt provided additional tx session and pt education as documented below  Pt presents functioning currently w/ overall mobility deficits 2* to: pain;  decreased LE strength/AROM decreased endurance; decreased activity tolerance; fatigue; (Please find additional objective findings from PT assessment regarding body systems outlined above ) Pt will continue to benefit from skilled PT interventions to address stated impairments; to maximize functional potential; for ongoing pt/ family training; and DME needs  Pt see for 1x tx session as documented below  Pt/ family agreeable to plan and goals as stated on evaluation       Barriers to Discharge: None     Recommendation: Outpatient PT(Women's health facility )     PT - OK to Discharge: (S) Yes    See flowsheet documentation for full assessment

## 2019-12-09 NOTE — PLAN OF CARE
Problem: PHYSICAL THERAPY ADULT  Goal: Performs mobility at highest level of function for planned discharge setting  See evaluation for individualized goals  Description  Treatment/Interventions: Spoke to case management, Spoke to nursing, Patient/family training  Equipment Recommended: (pt declining RW for d/c )       See flowsheet documentation for full assessment, interventions and recommendations  Note:   Prognosis: Good  Problem List: Decreased range of motion, Decreased mobility, Pain  Assessment: Pt is 22 y o  female seen for PT evaluation s/p admit to [facility] on 12/6/2019  PT consulted following vaginal delivery of a 9 lb baby boy on 12/7/ 19  Pt noted to have pubic symphysis separation ( xray confirmed 17-18mm separation)  - conservative management  Comorbidities affecting pt's physical performance at time of assessment listed above  Personal factors affecting pt at time of IE include: steps to enter environment, pain; limited mobility  Prior to admission, pt was living w/ SO and grandmother in a multistory home w/ main bed and bath   Upon evaluation, pt currently is requiring S>Indep A for bed skills; S>indep for functional transfers and S>Indep for ambulation w/o AD- PT provided education/ recommendation on use of RW however pt declining at this time and stating she is not having any increased pain- Pt provided additional tx session and pt education as documented below  Pt presents functioning currently w/ overall mobility deficits 2* to: pain;  decreased LE strength/AROM decreased endurance; decreased activity tolerance; fatigue; (Please find additional objective findings from PT assessment regarding body systems outlined above ) Pt will continue to benefit from skilled PT interventions to address stated impairments; to maximize functional potential; for ongoing pt/ family training; and DME needs  Pt see for 1x tx session as documented below    Pt/ family agreeable to plan and goals as stated on evaluation  Barriers to Discharge: None     Recommendation: Outpatient PT([facility] facility )     PT - OK to Discharge: (S) Yes    See flowsheet documentation for full assessment

## 2019-12-09 NOTE — PLAN OF CARE
Problem: PAIN - ADULT  Goal: Verbalizes/displays adequate comfort level or baseline comfort level  Description  Interventions:  - Encourage patient to monitor pain and request assistance  - Assess pain using appropriate pain scale  - Administer analgesics based on type and severity of pain and evaluate response  - Implement non-pharmacological measures as appropriate and evaluate response  - Consider cultural and social influences on pain and pain management  - Notify physician/advanced practitioner if interventions unsuccessful or patient reports new pain  Outcome: Progressing     Problem: INFECTION - ADULT  Goal: Absence or prevention of progression during hospitalization  Description  INTERVENTIONS:  - Assess and monitor for signs and symptoms of infection  - Monitor lab/diagnostic results  - Monitor all insertion sites, i e  indwelling lines, tubes, and drains  - Monitor endotracheal if appropriate and nasal secretions for changes in amount and color  - [place] appropriate cooling/warming therapies per order  - Administer medications as ordered  - Instruct and encourage patient and family to use good hand hygiene technique  - Identify and instruct in appropriate isolation precautions for identified infection/condition  Outcome: Progressing  Goal: Absence of fever/infection during neutropenic period  Description  INTERVENTIONS:  - Monitor WBC    Outcome: Progressing     Problem: SAFETY ADULT  Goal: Patient will remain free of falls  Description  INTERVENTIONS:  - Assess patient frequently for physical needs  -  Identify cognitive and physical deficits and behaviors that affect risk of falls    -  [place] fall precautions as indicated by assessment   - Educate patient/family on patient safety including physical limitations  - Instruct patient to call for assistance with activity based on assessment  - Modify environment to reduce risk of injury  - Consider OT/PT consult to assist with strengthening/mobility  Outcome: Progressing  Goal: Maintain or return to baseline ADL function  Description  INTERVENTIONS:  -  Assess patient's ability to carry out ADLs; assess patient's baseline for ADL function and identify physical deficits which impact ability to perform ADLs (bathing, care of mouth/teeth, toileting, grooming, dressing, etc )  - Assess/evaluate cause of self-care deficits   - Assess range of motion  - Assess patient's mobility; develop plan if impaired  - Assess patient's need for assistive devices and provide as appropriate  - Encourage maximum independence but intervene and supervise when necessary  - Involve family in performance of ADLs  - Assess for home care needs following discharge   - Consider OT consult to assist with ADL evaluation and planning for discharge  - Provide patient education as appropriate  Outcome: Progressing  Goal: Maintain or return mobility status to optimal level  Description  INTERVENTIONS:  - Assess patient's baseline mobility status (ambulation, transfers, stairs, etc )    - Identify cognitive and physical deficits and behaviors that affect mobility  - Identify mobility aids required to assist with transfers and/or ambulation (gait belt, sit-to-stand, lift, walker, cane, etc )  - [place] fall precautions as indicated by assessment  - Record patient progress and toleration of activity level on Mobility SBAR; progress patient to next Phase/Stage  - Instruct patient to call for assistance with activity based on assessment  - Consider rehabilitation consult to assist with strengthening/weightbearing, etc   Outcome: Progressing     Problem: Knowledge Deficit  Goal: Patient/family/caregiver demonstrates understanding of disease process, treatment plan, medications, and discharge instructions  Description  Complete learning assessment and assess knowledge base    Interventions:  - Provide teaching at level of understanding  - Provide teaching via preferred learning methods  Outcome: Progressing     Problem: DISCHARGE PLANNING  Goal: Discharge to home or other facility with appropriate resources  Description  INTERVENTIONS:  - Identify barriers to discharge w/patient and caregiver  - Arrange for needed discharge resources and transportation as appropriate  - Identify discharge learning needs (meds, wound care, etc )  - Arrange for interpretive services to assist at discharge as needed  - Refer to Case Management Department for coordinating discharge planning if the patient needs post-hospital services based on physician/advanced practitioner order or complex needs related to functional status, cognitive ability, or social support system  Outcome: Progressing

## 2019-12-10 NOTE — UTILIZATION REVIEW
Notification of Maternity/Delivery & [place] Birth Information for Admission   Notification of Maternity/Delivery for Admission to our facility Tu [facility]  Be advised that this patient was admitted to our facility under Inpatient Status  Contact Carmen Cortes at [phone] for additional admission information  [facility] PARENT[facility]CHILD HE[facility] UR DEPT  DEDICATED -[phone]  Mother &  Information   Patient Name: Sofya Graves   YOB: 1995   Delivering clinician: Sari Toney   OB History        3    Para   3    Term   2       1    AB        Living   3       SAB        TAB        Ectopic        Multiple   0    Live Births   3                Name & MRN:   Information for the patient's :  Tramaine Toby) [[id number]]     [place] Delivery Information:  Sex: male  Delivered 2019 8:03 AM by Vaginal, Spontaneous; Gestational Age: 43w3d    [place] Measurements:  Weight: 9 lb (4082 g); Height: 20 5"    APGAR 1 minute 5 minutes 10 minutes   Totals: 9 9      [place] Birth Information: 22 y o  female MRN: [id number] Unit/Bed#: L&D 307-01 Estimated Date of Delivery: 12/10/19  Birthweight: No birth weight on file  Gestational Age: <None> Delivery Type: Vaginal, Spontaneous          APGARS  One minute Five minutes Ten minutes   Totals:                 [address]   [address]:   [facility]  Tax ID: [id number]  NPI: [id number] Attending Provider/NPI: Carol Smith Md [[id number]]   Attending Physician:  SANDRA Barboza    [facility] ID- [id number]  [facility], [address]  Phone: [phone]  Fax: [phone]   Place of Service Code: 24     Place of Service Name:  [address] [place] [address]   Start Date: 19 IPADMITTIME@     Discharge Date & Time: 2019  6:18 PM    Type of Admission: Inpatient Status Discharge Disposition (if discharged): Home/Self Care   Patient Diagnoses:   Encounter for induction of labor [Z34 90]  The primary encounter diagnosis was 39 weeks gestation of pregnancy  A diagnosis of  (spontaneous vaginal delivery) was also pertinent to this visit  1  39 weeks gestation of pregnancy    2   (spontaneous vaginal delivery)       Orders: Admission Orders (From admission, onward)     Ordered        19  Inpatient Admission  Once                    Assigned Utilization Review Contact: Saida Porter Utilization Review Department  Phone: [phone]; Fax [phone]  Email: [email or web address]  [facility]

## 2019-12-15 LAB — PLACENTA IN STORAGE: NORMAL

## 2019-12-26 ENCOUNTER — POSTPARTUM VISIT (OUTPATIENT)
Dept: OBGYN CLINIC | Facility: CLINIC | Age: 24
End: 2019-12-26

## 2019-12-26 VITALS
DIASTOLIC BLOOD PRESSURE: 60 MMHG | WEIGHT: 127.2 LBS | HEART RATE: 88 BPM | HEIGHT: 63 IN | SYSTOLIC BLOOD PRESSURE: 100 MMHG | BODY MASS INDEX: 22.54 KG/M2

## 2019-12-26 DIAGNOSIS — B96.89 BV (BACTERIAL VAGINOSIS): ICD-10-CM

## 2019-12-26 DIAGNOSIS — N89.8 VAGINAL ODOR: ICD-10-CM

## 2019-12-26 DIAGNOSIS — N76.0 BV (BACTERIAL VAGINOSIS): ICD-10-CM

## 2019-12-26 PROBLEM — Z3A.39 39 WEEKS GESTATION OF PREGNANCY: Status: RESOLVED | Noted: 2019-12-07 | Resolved: 2019-12-26

## 2019-12-26 PROBLEM — O99.013 ANEMIA DURING PREGNANCY IN THIRD TRIMESTER: Status: RESOLVED | Noted: 2019-10-01 | Resolved: 2019-12-26

## 2019-12-26 PROBLEM — B00.9 HSV INFECTION: Status: RESOLVED | Noted: 2018-12-06 | Resolved: 2019-12-26

## 2019-12-26 PROBLEM — Z34.93 PRENATAL CARE IN THIRD TRIMESTER: Status: RESOLVED | Noted: 2019-07-09 | Resolved: 2019-12-26

## 2019-12-26 PROBLEM — O09.893 SHORT INTERVAL BETWEEN PREGNANCIES COMPLICATING PREGNANCY, ANTEPARTUM, THIRD TRIMESTER: Status: RESOLVED | Noted: 2019-09-30 | Resolved: 2019-12-26

## 2019-12-26 PROBLEM — O35.2XX0 PREVIOUS CHILD WITH CONGENITAL ANOMALY, CURRENTLY PREGNANT, ANTEPARTUM: Status: RESOLVED | Noted: 2019-05-10 | Resolved: 2019-12-26

## 2019-12-26 LAB — SL AMB POCT WET MOUNT: ABNORMAL

## 2019-12-26 PROCEDURE — 87210 SMEAR WET MOUNT SALINE/INK: CPT | Performed by: NURSE PRACTITIONER

## 2019-12-26 PROCEDURE — 99213 OFFICE O/P EST LOW 20 MIN: CPT | Performed by: NURSE PRACTITIONER

## 2019-12-26 RX ORDER — METRONIDAZOLE 500 MG/1
500 TABLET ORAL EVERY 12 HOURS SCHEDULED
Qty: 14 TABLET | Refills: 0 | Status: SHIPPED | OUTPATIENT
Start: 2019-12-26 | End: 2020-01-02

## 2019-12-26 NOTE — PATIENT INSTRUCTIONS
POSTPARTUM    You should contact your OBGYN provider if you experience any of the followin  Bleeding that soaks a pad every hour for 2 hours  2  Foul odor coming from your vagina  3  Fever of 100 4 or higher  4  Incision or abdominal pain that will not go away despite prescribed pain medications  5  Swelling, redness, discharge or bleeding from your  incision or perineal tear site  6  Your incision begins to separate  7  Problems urinating including inability to urinate, burning while urinating or extremely dark urine  8  No bowel movement within 4 days of giving birth, or difficulty with bowel movements after that  9  Any type of visual disturbance (double vision, blurring, etc)  10  Severe headache  11  Flu-like symptoms  12  Pain or redness in one or both of your breasts  13  Pain, warmth, tenderness or swelling in your legs, especially the calf area  14  Frequent nausea and vomiting  15  Signs of depression or anxiety  16  If you experience chest pains or have problems breathing, call 911 immediately  In general you may resume sexual vaginal intercourse after 6 weeks of delivery  It is important to continue changing your sanitary pads frequently and clean the perineum at least 2-3 times daily  Keep abdominal incision after  delivery clean and dry at all times

## 2019-12-26 NOTE — PROGRESS NOTES
POSTPARTUM VISIT     Karen Uriarte presents today for postpartum visit  She had a vaginal delivery on 12/7/2019  Complications included 2nd degree perineal laceration with repair  She is bottlefeeding her infant and reports no issues with such  She desired Nexplanon for contraception and it was inserted on 12/9/2019 prior to hospital discharge  She was provided with Rebecca Corea Depression Screening tool and her score was 2  Review of Systems:   -Constitutional: denies issues, denies pain   -Breasts: denies tenderness   -Gynecologic: lochia continues - small amount, reports vaginal odor   -Urinary: denies issues urinating   -GI: stools WNL, denies issues    Physical Exam:   -Vitals:   Vitals:    12/26/19 0911   BP: 100/60   Pulse: 88   Weight: 57 7 kg (127 lb 3 2 oz)   Height: 5' 3" (1 6 m)      -General: A&Ox3, no acute distress noted   -Abdomen: soft, non-tender   -Extremities: nontender, no edema noted   -Breasts: deferred   -Pelvic exam:    External genitalia: nontender, perineum with suture intact    Vagina: pink, rugae, no lesions/masses, + blood in vault, + discharge    Cervix: no lesions/lacerations    Uterus: nontender    Wet mount: + clue cells, no trichomonads, no hyphae, + WHIFF    Assessment/Plan:  1  Normal postpartum exam   2  Depression screening negative  3  Advise return for annual GYN exam in 6 months  4  Contraception: Nexplanon  5  Bacterial vaginosis: given Rx Flagyl

## 2020-01-10 PROBLEM — D64.9 ANEMIA: Status: ACTIVE | Noted: 2020-01-10

## 2020-03-02 ENCOUNTER — OFFICE VISIT (OUTPATIENT)
Dept: OBGYN CLINIC | Facility: CLINIC | Age: 25
End: 2020-03-02

## 2020-03-02 VITALS — SYSTOLIC BLOOD PRESSURE: 98 MMHG | DIASTOLIC BLOOD PRESSURE: 58 MMHG | WEIGHT: 131.4 LBS | BODY MASS INDEX: 23.28 KG/M2

## 2020-03-02 DIAGNOSIS — B37.9 YEAST INFECTION: Primary | ICD-10-CM

## 2020-03-02 LAB
BV WHIFF TEST VAG QL: POSITIVE
CLUE CELLS SPEC QL WET PREP: NEGATIVE
PH SMN: 5.5 [PH]
SL AMB POCT WET MOUNT: ABNORMAL
T VAGINALIS VAG QL WET PREP: NEGATIVE
YEAST VAG QL WET PREP: POSITIVE

## 2020-03-02 PROCEDURE — 99213 OFFICE O/P EST LOW 20 MIN: CPT | Performed by: NURSE PRACTITIONER

## 2020-03-02 PROCEDURE — 87210 SMEAR WET MOUNT SALINE/INK: CPT | Performed by: NURSE PRACTITIONER

## 2020-03-02 RX ORDER — FLUCONAZOLE 150 MG/1
150 TABLET ORAL ONCE
Qty: 1 TABLET | Refills: 1 | Status: SHIPPED | OUTPATIENT
Start: 2020-03-02 | End: 2020-03-02

## 2020-03-02 NOTE — PATIENT INSTRUCTIONS
Take Fluconazole as directed  Wear loose clothes and cotton underwear  Annual GYN exam is due after 12/6/2020

## 2020-03-02 NOTE — PROGRESS NOTES
Assessment/Plan:     Diagnoses and all orders for this visit:    Yeast infection  -     fluconazole (DIFLUCAN) 150 mg tablet; Take 1 tablet (150 mg total) by mouth once for 1 dose  -     POCT wet mount     Explained wet mount was positive for yeast  Safe and effective use of Fluconazole was provided, comfort meases were discussed  Plan   Take Fluconazole as directed  Wear loose clothes and cotton underwear  Annual GYN exam is due after 12/6/2020  Pt verbalized understanding of all discussed  Subjective:      Patient ID: Ashley Camargo is a 26 y o  female  HPI  Pt presents with white discharge on and off for 2 weeks  Last WNL PAP 12/2018    The following portions of the patient's history were reviewed and updated as appropriate: allergies, current medications, past family history, past medical history, past social history, past surgical history and problem list     Review of Systems      Pertinent items are note in the HPI    Objective:      BP 98/58   Wt 59 6 kg (131 lb 6 4 oz)   LMP 02/22/2020 (Exact Date)   BMI 23 28 kg/m²          Physical Exam    Alert and oriented  Denies pain  Vulva negative lesions, slight erythema  Vagina erythema, positive thick white discharge   Cervix positive thick white discharge, negative lesions  Wet mount positive for yeast

## 2020-07-07 ENCOUNTER — OFFICE VISIT (OUTPATIENT)
Dept: OBGYN CLINIC | Facility: CLINIC | Age: 25
End: 2020-07-07

## 2020-07-07 VITALS
HEART RATE: 69 BPM | WEIGHT: 131.4 LBS | TEMPERATURE: 96.9 F | SYSTOLIC BLOOD PRESSURE: 106 MMHG | HEIGHT: 63 IN | BODY MASS INDEX: 23.28 KG/M2 | DIASTOLIC BLOOD PRESSURE: 73 MMHG

## 2020-07-07 DIAGNOSIS — N89.8 VAGINAL DISCHARGE: ICD-10-CM

## 2020-07-07 DIAGNOSIS — B37.3 VAGINA, CANDIDIASIS: ICD-10-CM

## 2020-07-07 DIAGNOSIS — N76.0 BV (BACTERIAL VAGINOSIS): ICD-10-CM

## 2020-07-07 DIAGNOSIS — N89.8 VAGINA ITCHING: Primary | ICD-10-CM

## 2020-07-07 DIAGNOSIS — B96.89 BV (BACTERIAL VAGINOSIS): ICD-10-CM

## 2020-07-07 LAB
BV WHIFF TEST VAG QL: POSITIVE
CLUE CELLS SPEC QL WET PREP: POSITIVE
SL AMB POCT WET MOUNT: ABNORMAL
T VAGINALIS VAG QL WET PREP: NEGATIVE
YEAST VAG QL WET PREP: POSITIVE

## 2020-07-07 PROCEDURE — 87210 SMEAR WET MOUNT SALINE/INK: CPT | Performed by: NURSE PRACTITIONER

## 2020-07-07 PROCEDURE — 99213 OFFICE O/P EST LOW 20 MIN: CPT | Performed by: NURSE PRACTITIONER

## 2020-07-07 RX ORDER — FLUCONAZOLE 150 MG/1
150 TABLET ORAL
Qty: 2 TABLET | Refills: 0 | Status: SHIPPED | OUTPATIENT
Start: 2020-07-07 | End: 2020-07-11

## 2020-07-07 RX ORDER — METRONIDAZOLE 500 MG/1
500 TABLET ORAL EVERY 12 HOURS SCHEDULED
Qty: 14 TABLET | Refills: 0 | Status: SHIPPED | OUTPATIENT
Start: 2020-07-07 | End: 2020-07-14

## 2020-07-07 RX ORDER — CLOTRIMAZOLE 1 %
1 CREAM WITH APPLICATOR VAGINAL
Qty: 45 G | Refills: 0 | Status: SHIPPED | OUTPATIENT
Start: 2020-07-07 | End: 2020-07-14

## 2020-07-07 NOTE — PROGRESS NOTES
PROBLEM GYNECOLOGICAL VISIT    Jaciel Foy is a 26 y o  female who presents today with complaint of vaginal discharge and itching  Her general medical history has been reviewed and she reports it as follows:    Past Medical History:   Diagnosis Date    Chlamydia 2014    Gonorrhea 2014    Herpes simplex     last outbreak 2019, uses Valtrex prn     Past Surgical History:   Procedure Laterality Date    FETAL SURGERY  2019    fetoscopic surgery 2019 for daughter with spina bifida     OB History        3    Para   3    Term   2       1    AB   0    Living   3       SAB   0    TAB   0    Ectopic   0    Multiple   0    Live Births   3               Social History     Tobacco Use    Smoking status: Never Smoker    Smokeless tobacco: Never Used   Substance Use Topics    Alcohol use: Yes     Frequency: Monthly or less     Drinks per session: 1 or 2    Drug use: Never       Current Outpatient Medications:     clotrimazole (GYNE-LOTRIMIN) 1 % vaginal cream, Insert 1 applicator into the vagina daily at bedtime for 7 days, Disp: 45 g, Rfl: 0    fluconazole (DIFLUCAN) 150 mg tablet, Take 1 tablet (150 mg total) by mouth every 3 (three) days for 2 doses, Disp: 2 tablet, Rfl: 0    metroNIDAZOLE (FLAGYL) 500 mg tablet, Take 1 tablet (500 mg total) by mouth every 12 (twelve) hours for 7 days, Disp: 14 tablet, Rfl: 0    History of Present Illness:   Reports vaginal itching/burning and discharge for past few months  Was treated in 3/2020 with Diflucan 1 tablet without relief  Review of Systems:  Review of Systems   Constitutional: Negative  Gastrointestinal: Negative  Genitourinary: Positive for vaginal discharge  Negative for menstrual problem          Vaginal dishcarge       Physical Exam:  /73 (BP Location: Right arm, Patient Position: Sitting, Cuff Size: Standard)   Pulse 69   Temp (!) 96 9 °F (36 1 °C)   Ht 5' 3" (1 6 m)   Wt 59 6 kg (131 lb 6 4 oz)   LMP 2020   BMI 23 28 kg/m²   Physical Exam   Constitutional: She appears well-developed and well-nourished  No distress  Genitourinary: There is erythema in the vagina  Vaginal discharge found  Abdominal: Soft  There is no tenderness  Skin: Skin is warm and dry  Vitals reviewed  Point of Care Testing:   -Wet mount: + clue cells, no trichomonads, many WBC's   -KOH mount: + hyphae   -Whiff: positive    Assessment:   1  Bacterial vaginosis  2  Vaginal candidiasis  Plan:   1  Given Rx Diflucan 150mg every 3 days x2 doses  Given Rx Clotrimazole vaginal cream    2  Given Rx Flagyl     3  Return to office next week as previously scheduled for annual GYN exam

## 2020-07-14 ENCOUNTER — ANNUAL EXAM (OUTPATIENT)
Dept: OBGYN CLINIC | Facility: CLINIC | Age: 25
End: 2020-07-14

## 2020-07-14 VITALS
WEIGHT: 133 LBS | SYSTOLIC BLOOD PRESSURE: 96 MMHG | BODY MASS INDEX: 23.57 KG/M2 | HEIGHT: 63 IN | HEART RATE: 77 BPM | DIASTOLIC BLOOD PRESSURE: 63 MMHG | TEMPERATURE: 98.4 F

## 2020-07-14 DIAGNOSIS — B37.9 YEAST INFECTION: Primary | ICD-10-CM

## 2020-07-14 PROCEDURE — 87210 SMEAR WET MOUNT SALINE/INK: CPT | Performed by: NURSE PRACTITIONER

## 2020-07-14 PROCEDURE — 99213 OFFICE O/P EST LOW 20 MIN: CPT | Performed by: NURSE PRACTITIONER

## 2020-07-14 RX ORDER — FLUCONAZOLE 150 MG/1
TABLET ORAL
Qty: 3 TABLET | Refills: 1 | Status: SHIPPED | OUTPATIENT
Start: 2020-07-17 | End: 2020-07-24

## 2020-07-14 NOTE — PATIENT INSTRUCTIONS
Complete Metronidazole as directed  Start Fluconazole as directed when you have completed Metronidazole  Take Fluconazole day 1, day 4 and day 7  Use Clotrimazole externally as directed  Wear loose clothes and cotton underwear  Call with needs or concerns  Return for annual GYN exam, next PAP is due 12/2021     COVID-19 Instructions    If you are having any of the following:  Cough   Shortness of breath   Fever  If traveled within past 2 weeks internationally or to high risk [place] states  Or been in contact with someone that has     Please call either:   Your PCP office  [phone], option [phone]    They will screen you over the phone and direct you to the nearest appropriate testing location    DO NOT go to your PCP or OB office without calling first

## 2020-07-14 NOTE — PROGRESS NOTES
Assessment/Plan:     Diagnoses and all orders for this visit:    Yeast infection  -     fluconazole (DIFLUCAN) 150 mg tablet; Start 7/17 take 1 tablet day 1 (7/17/2020), day 4 and day 7  -     POCT wet mount      Explained wet mount is positive for yeast, BV is no longer visible  Complete course of Metronidazole  Use Clotrimazole externall for vulvar itching and burning  Start Fluconazole when Metronidazole is complete, day 1, day 4 and day 7    Plan  Complete Metronidazole as directed  Start Fluconazole as directed when you have completed Metronidazole  Take Fluconazole day 1, day 4 and day 7  Use Clotrimazole externally as directed  Wear loose clothes and cotton underwear  Call with needs or concerns  Return for annual GYN exam, next PAP is due 12/2021   Pt verbalized understanding of all discussed  Subjective:      Patient ID: Khushboo Contreras is a 26 y o  female  HPI  Pt presents for annual exam but states she woke up with vulvar and vaginal burning,  Pt started treatment 7/7 for BV and yeast  Pt states she took 2 doses of Fluconazole as directed is almost finished Metronidazole and is using Clotrimazole vaginally but stopped a day ago due to the start of vaginal burning  Pt states she is getting  in 1 1/2 weeks and wants the yeast infection to go away  The following portions of the patient's history were reviewed and updated as appropriate: allergies, current medications, past family history, past medical history, past social history, past surgical history and problem list     Review of Systems      Pertinent items are note in the HPI    Objective:      BP 96/63 (BP Location: Left arm, Patient Position: Sitting, Cuff Size: Adult)   Pulse 77   Temp 98 4 °F (36 9 °C) (Temporal)   Ht 5' 3" (1 6 m)   Wt 60 3 kg (133 lb)   LMP 06/30/2020 (Exact Date)   BMI 23 56 kg/m²          Physical Exam    Alert and oriented  WNL respiratory effort, negative cough or SOB  Negative fever  C/O vulvar and vaginal pain  Vulva negative lesions, positive erythema  Vagina erythema, positive thick white discharge   Cervix positive thick white discharge, negative lesions  Wet mount positive for yeast

## 2020-08-24 ENCOUNTER — TELEPHONE (OUTPATIENT)
Dept: OBGYN CLINIC | Facility: CLINIC | Age: 25
End: 2020-08-24

## 2020-08-25 ENCOUNTER — ANNUAL EXAM (OUTPATIENT)
Dept: OBGYN CLINIC | Facility: CLINIC | Age: 25
End: 2020-08-25

## 2020-08-25 VITALS
DIASTOLIC BLOOD PRESSURE: 68 MMHG | SYSTOLIC BLOOD PRESSURE: 104 MMHG | TEMPERATURE: 98.2 F | WEIGHT: 130 LBS | BODY MASS INDEX: 23.03 KG/M2 | HEART RATE: 68 BPM

## 2020-08-25 DIAGNOSIS — Z01.419 ENCOUNTER FOR ANNUAL ROUTINE GYNECOLOGICAL EXAMINATION: Primary | ICD-10-CM

## 2020-08-25 DIAGNOSIS — Z11.3 SCREEN FOR STD (SEXUALLY TRANSMITTED DISEASE): ICD-10-CM

## 2020-08-25 PROCEDURE — 87591 N.GONORRHOEAE DNA AMP PROB: CPT | Performed by: NURSE PRACTITIONER

## 2020-08-25 PROCEDURE — 87491 CHLMYD TRACH DNA AMP PROBE: CPT | Performed by: NURSE PRACTITIONER

## 2020-08-25 PROCEDURE — 99395 PREV VISIT EST AGE 18-39: CPT | Performed by: NURSE PRACTITIONER

## 2020-08-25 NOTE — PROGRESS NOTES
Annual Exam    Assessment   1  Encounter for annual routine gynecological examination     2  Screen for STD (sexually transmitted disease)  Chlamydia/GC amplified DNA by PCR     well woman       Plan       All questions answered  Breast self exam technique reviewed and patient encouraged to perform self-exam monthly  Chlamydia specimen  Contraception: Nexplanon  Discussed healthy lifestyle modifications  Follow up in 1 year  GC specimen  Patient Instructions   STD results will be available in 2 weeks  Call with needs or concerns  Return in 1 year  Pt verbalized understanding of all discussed  Subjective      Seema Powell is a 26 y o  [id number] female who presents for annual well woman exam  Periods are not occurring with Nexplanon, lasting 0 days  No intermenstrual bleeding, spotting, or discharge  Current contraception: Nexplanon  History of abnormal Pap smear: no  Family history of uterine or ovarian cancer: no  Regular self breast exam: yes  History of abnormal mammogram: N/A  Family history of breast cancer: no  History of abnormal lipids: unknown  Menstrual History:  OB History        3    Para   3    Term   2       1    AB   0    Living   3       SAB   0    TAB   0    Ectopic   0    Multiple   0    Live Births   1                Menarche age: 16  Patient's last menstrual period was 2020 (exact date)  The following portions of the patient's history were reviewed and updated as appropriate: allergies, current medications, past family history, past medical history, past social history, past surgical history and problem list   1 partner 4 years, denies domestic violence  Last WNL PAP was 2018    Review of Systems  Pertinent items are noted in HPI        Objective      /68   Pulse 68   Temp 98 2 °F (36 8 °C)   Wt 59 kg (130 lb)   LMP 2020 (Exact Date)   BMI 23 03 kg/m²     General: alert and oriented, in no acute distress, alert, appears stated age and cooperative   Heart: regular rate and rhythm, S1, S2 normal, no murmur, click, rub or gallop   Lungs: clear to auscultation bilaterally, WNK respiratory effort, negative cough or SOB   Thyroid: Negative masses   Abdomen: soft, non-tender, without masses or organomegaly   Vulva: normal   Vagina: normal mucosa   Cervix: no cervical motion tenderness and no lesions   Uterus: normal size, non-tender, normal shape and consistency   Adnexa: normal adnexa   Urethra: normal   Breasts: NT,negative masses, discharge, or dimpling  Negative fever

## 2020-08-25 NOTE — PATIENT INSTRUCTIONS
STD results will be available in 2 weeks  Call with needs or concerns  Return in 1 year    COVID-19 Instructions    If you are having any of the following:  Cough   Shortness of breath   Fever  If traveled within past 2 weeks internationally or to high risk [place] states  Or been in contact with someone that has     Please call either:   Your PCP office  [phone], option [phone]    They will screen you over the phone and direct you to the nearest appropriate testing location    DO NOT go to your PCP or OB office without calling first

## 2020-08-28 LAB
C TRACH DNA SPEC QL NAA+PROBE: NEGATIVE
N GONORRHOEA DNA SPEC QL NAA+PROBE: NEGATIVE

## 2020-08-31 NOTE — RESULT ENCOUNTER NOTE
GC and Chlamydia negative (collected during annual exam)  Please call patient to inform her, thank you

## 2021-01-04 ENCOUNTER — OFFICE VISIT (OUTPATIENT)
Dept: OBGYN CLINIC | Facility: CLINIC | Age: 26
End: 2021-01-04

## 2021-01-04 VITALS
BODY MASS INDEX: 22.5 KG/M2 | SYSTOLIC BLOOD PRESSURE: 97 MMHG | HEART RATE: 73 BPM | WEIGHT: 127 LBS | DIASTOLIC BLOOD PRESSURE: 56 MMHG

## 2021-01-04 DIAGNOSIS — B37.9 YEAST INFECTION: Primary | ICD-10-CM

## 2021-01-04 PROCEDURE — 87210 SMEAR WET MOUNT SALINE/INK: CPT | Performed by: NURSE PRACTITIONER

## 2021-01-04 PROCEDURE — 99213 OFFICE O/P EST LOW 20 MIN: CPT | Performed by: NURSE PRACTITIONER

## 2021-01-04 RX ORDER — FLUCONAZOLE 150 MG/1
150 TABLET ORAL ONCE
Qty: 1 TABLET | Refills: 1 | Status: SHIPPED | OUTPATIENT
Start: 2021-01-04 | End: 2021-01-04

## 2021-01-04 NOTE — PATIENT INSTRUCTIONS
Take Flucomazole as directed  Wear loose clothes and cotton underwear  Annual GYN exam is due 8/2021  Call with needs or concerns    COVID-19 Instructions    If you are having any of the following:  Cough   Shortness of breath   Fever  If traveled within past 2 weeks internationally or to high risk [place] states  Or been in contact with someone that has     Please call either:   Your PCP office  [phone], option [phone]    They will screen you over the phone and direct you to the nearest appropriate testing location    DO NOT go to your PCP or OB office without calling first

## 2021-01-04 NOTE — PROGRESS NOTES
Assessment/Plan:     Diagnoses and all orders for this visit:    Yeast infection  -     fluconazole (DIFLUCAN) 150 mg tablet; Take 1 tablet (150 mg total) by mouth once for 1 dose  -     POCT wet mount     Explained wet mount was positive for yeast, safe and effective use of Fluconazole was provided  Discussed comfort measures    Plan   Take Flucomazole as directed  Wear loose clothes and cotton underwear  Annual GYN exam is due 8/2021  Call with needs or concerns  Pt verbalized understanding of all discussed  Subjective:      Patient ID: Rosalva Lin is a 26 y o  female  HPI  Pt presents with C/O vaginal discharge and burning for several days pt states pain is increased with intercourse  Last WNL PAP 12/2018    The following portions of the patient's history were reviewed and updated as appropriate: allergies, current medications, past family history, past medical history, past social history, past surgical history and problem list     Review of Systems      Pertinent items are note in the HPI    Objective:      BP 97/56   Pulse 73   Wt 57 6 kg (127 lb)   LMP 12/04/2020   BMI 22 50 kg/m²          Physical Exam    Alert and oriented  Denies pain  WNL respiratory effort, negative cough or SOB  Vulva negative lesions, slight erythema  Vagina erythema, positive thick white discharge   Cervix positive thick white discharge, negative lesions  Wet mount positive for yeast

## 2021-10-06 ENCOUNTER — ANNUAL EXAM (OUTPATIENT)
Dept: OBGYN CLINIC | Facility: CLINIC | Age: 26
End: 2021-10-06

## 2021-10-06 VITALS
BODY MASS INDEX: 20.83 KG/M2 | HEIGHT: 65 IN | DIASTOLIC BLOOD PRESSURE: 63 MMHG | WEIGHT: 125 LBS | HEART RATE: 82 BPM | SYSTOLIC BLOOD PRESSURE: 97 MMHG

## 2021-10-06 DIAGNOSIS — Z11.3 SCREEN FOR STD (SEXUALLY TRANSMITTED DISEASE): ICD-10-CM

## 2021-10-06 DIAGNOSIS — Z01.419 ENCOUNTER FOR ANNUAL ROUTINE GYNECOLOGICAL EXAMINATION: ICD-10-CM

## 2021-10-06 DIAGNOSIS — B37.9 YEAST INFECTION: Primary | ICD-10-CM

## 2021-10-06 LAB
BV WHIFF TEST VAG QL: NEGATIVE
CLUE CELLS SPEC QL WET PREP: NEGATIVE
PH SMN: ABNORMAL [PH]
SL AMB POCT WET MOUNT: ABNORMAL
T VAGINALIS VAG QL WET PREP: NEGATIVE
YEAST VAG QL WET PREP: PRESENT

## 2021-10-06 PROCEDURE — 99395 PREV VISIT EST AGE 18-39: CPT | Performed by: OBSTETRICS & GYNECOLOGY

## 2021-10-06 PROCEDURE — G0145 SCR C/V CYTO,THINLAYER,RESCR: HCPCS | Performed by: FAMILY MEDICINE

## 2021-10-06 PROCEDURE — 87210 SMEAR WET MOUNT SALINE/INK: CPT | Performed by: OBSTETRICS & GYNECOLOGY

## 2021-10-06 RX ORDER — FLUCONAZOLE 150 MG/1
150 TABLET ORAL ONCE
Qty: 1 TABLET | Refills: 1 | Status: SHIPPED | OUTPATIENT
Start: 2021-10-06 | End: 2022-01-20

## 2021-10-11 LAB
LAB AP GYN PRIMARY INTERPRETATION: NORMAL
Lab: NORMAL

## 2022-01-20 DIAGNOSIS — B37.9 YEAST INFECTION: ICD-10-CM

## 2022-01-20 RX ORDER — FLUCONAZOLE 150 MG/1
150 TABLET ORAL ONCE
Qty: 1 TABLET | Refills: 0 | Status: SHIPPED | OUTPATIENT
Start: 2022-01-20 | End: 2022-01-20

## 2022-10-20 PROBLEM — Z01.419 ENCOUNTER FOR ANNUAL ROUTINE GYNECOLOGICAL EXAMINATION: Status: RESOLVED | Noted: 2020-08-25 | Resolved: 2022-10-20

## 2022-10-20 PROBLEM — B37.9 YEAST INFECTION: Status: RESOLVED | Noted: 2020-03-02 | Resolved: 2022-10-20

## 2022-10-20 PROBLEM — Z11.3 SCREEN FOR STD (SEXUALLY TRANSMITTED DISEASE): Status: RESOLVED | Noted: 2020-08-25 | Resolved: 2022-10-20

## 2022-11-09 ENCOUNTER — ANNUAL EXAM (OUTPATIENT)
Dept: OBGYN CLINIC | Facility: CLINIC | Age: 27
End: 2022-11-09

## 2022-11-09 VITALS
BODY MASS INDEX: 24.38 KG/M2 | WEIGHT: 137.6 LBS | HEIGHT: 63 IN | SYSTOLIC BLOOD PRESSURE: 103 MMHG | HEART RATE: 71 BPM | DIASTOLIC BLOOD PRESSURE: 68 MMHG

## 2022-11-09 DIAGNOSIS — Z01.419 ENCOUNTER FOR ANNUAL ROUTINE GYNECOLOGICAL EXAMINATION: Primary | ICD-10-CM

## 2022-11-09 NOTE — PROGRESS NOTES
Assessment     Astrid Presley is a 33 y o  [id number] with normal gynecologic exam      Plan     1  Routine well woman exam done today  2   Pap and HPV:Pap with HPV was not done today  Current ASCCP Guidelines reviewed  3   The patient declined STD testing  Safe sex practices have been discussed  4  The patient is sexually active  5  The following were reviewed in today's visit: STD testing, exercise and healthy diet  6  Patient to return to office in 1 month for Nexplanon removal and reinsertion  Form filled out  Subjective     Astrid Presley is a 33 y o  female who presents for annual well woman exam  Periods are regular every 28-30 days, lasting 4-7 periods days  LMP 10/10/2022  GYN:  · No vaginal discharge, labial erythema or lesions, dyspareunia  · Contraception: Nexplanon  · Patient is sexually active with one partner  OB:  · [id number] female  Pregnancies were complicated by spina bifida in one daughter  :  · No dysuria, urinary frequency or urgency  · No hematuria, flank pain, incontinence  Breast:  · No breast mass, skin changes, dimpling, reddening, nipple retraction  · No breast discharge      · Patient does not have a family history of breast, endometrial, or ovarian ca  General:  · Diet: healthy  · Exercise: none  · Work: stays at home  · ETOH use: None  · Tobacco use: None  · Recreational drug use: None    Screening:  · Cervical cancer: last pap smear in 10/2021  Results were negative for malignancy  · Breast cancer: at age 37 years  · STD screening: declined  Review of Systems  Pertinent items are noted in HPI  Objective      /68   Pulse 71   Ht 5' 3" (1 6 m)   Wt 62 4 kg (137 lb 9 6 oz)   LMP 10/10/2022 (Approximate)   BMI 24 37 kg/m²     Physical Exam  Vitals reviewed  Constitutional:       General: She is not in acute distress  Appearance: She is well-developed  She is not diaphoretic     HENT:      Head: Normocephalic and atraumatic  Eyes:      Conjunctiva/sclera: Conjunctivae normal    Cardiovascular:      Rate and Rhythm: Normal rate and regular rhythm  Heart sounds: Normal heart sounds  No murmur heard  No friction rub  No gallop  Pulmonary:      Effort: Pulmonary effort is normal  No respiratory distress  Breath sounds: Normal breath sounds  No wheezing or rales  Abdominal:      General: Bowel sounds are normal  There is no distension  Palpations: Abdomen is soft  There is no mass  Tenderness: There is no abdominal tenderness  There is no guarding  Musculoskeletal:         General: No tenderness or deformity  Normal range of motion  Cervical back: Normal range of motion  Right lower leg: No edema  Left lower leg: No edema  Skin:     General: Skin is warm and dry  Neurological:      General: No focal deficit present  Mental Status: She is alert and oriented to person, place, and time              Lory Acevedo MD  11/9/2022

## 2022-11-28 ENCOUNTER — TELEPHONE (OUTPATIENT)
Dept: OBGYN CLINIC | Facility: CLINIC | Age: 27
End: 2022-11-28

## 2022-12-08 NOTE — PROGRESS NOTES
Universal Protocol:  Consent: Verbal consent obtained  Written consent obtained  Risks and benefits: risks, benefits and alternatives were discussed  Consent given by: patient  Time out: Immediately prior to procedure a "time out" was called to verify the correct patient, procedure, equipment, support staff and site/side marked as required    Patient understanding: patient states understanding of the procedure being performed  Patient consent: the patient's understanding of the procedure matches consent given  Procedure consent: procedure consent matches procedure scheduled  Patient identity confirmed: verbally with patient    Remove and insert drug implant    Date/Time: 12/8/2022 11:08 AM  Performed by: Germaine Crawley MD  Authorized by: Germaine Crawley MD     Indication:     Indication: Presence of non-biodegradable drug delivery implant    Pre-procedure:     Pre-procedure timeout performed: yes      Prepped with: alcohol 70% and povidone-iodine      Local anesthetic:  Lidocaine without epinephrine    The site was cleaned and prepped in a sterile fashion: yes    Procedure:     Procedure:  Removal with reinsertion    Small stab incision was made in arm: yes      Left/right:  Left    Preloaded contraceptive capsule trocar was placed subdermally: yes      Visualization of implant was obtained: yes      Contraceptive capsule was inserted and trocar removed: yes      Visualization of notch in stylet and palpation of device: yes      Palpation confirms placement by provider and patient: yes      Site was closed with steri-strips and pressure bandage applied: yes    Comments:      One interrupted suture placed for hemostatsis

## 2022-12-14 ENCOUNTER — PROCEDURE VISIT (OUTPATIENT)
Dept: OBGYN CLINIC | Facility: CLINIC | Age: 27
End: 2022-12-14

## 2022-12-14 DIAGNOSIS — Z30.46 ENCOUNTER FOR REMOVAL AND REINSERTION OF NEXPLANON: Primary | ICD-10-CM

## 2022-12-14 RX ORDER — LIDOCAINE HYDROCHLORIDE 10 MG/ML
3 INJECTION, SOLUTION INFILTRATION; PERINEURAL ONCE
Status: COMPLETED | OUTPATIENT
Start: 2022-12-14 | End: 2022-12-14

## 2022-12-14 RX ADMIN — LIDOCAINE HYDROCHLORIDE 3 ML: 10 INJECTION, SOLUTION INFILTRATION; PERINEURAL at 16:36

## 2023-02-07 ENCOUNTER — TELEPHONE (OUTPATIENT)
Dept: OBGYN CLINIC | Facility: CLINIC | Age: 28
End: 2023-02-07

## 2024-07-12 ENCOUNTER — OFFICE VISIT (OUTPATIENT)
Dept: DENTISTRY | Facility: CLINIC | Age: 29
End: 2024-07-12

## 2024-07-12 VITALS — DIASTOLIC BLOOD PRESSURE: 71 MMHG | SYSTOLIC BLOOD PRESSURE: 107 MMHG | TEMPERATURE: 98.1 F | HEART RATE: 76 BPM

## 2024-07-12 DIAGNOSIS — Z01.20 ENCOUNTER FOR DENTAL EXAMINATION: Primary | ICD-10-CM

## 2024-07-12 PROCEDURE — D0140 LIMITED ORAL EVALUATION - PROBLEM FOCUSED: HCPCS

## 2024-07-12 PROCEDURE — D0270 BITEWING - SINGLE RADIOGRAPHIC IMAGE: HCPCS

## 2024-07-12 PROCEDURE — D2940 PROTECTIVE RESTORATION: HCPCS

## 2024-07-12 PROCEDURE — D0220 INTRAORAL - PERIAPICAL FIRST RADIOGRAPHIC IMAGE: HCPCS

## 2024-07-18 ENCOUNTER — TELEPHONE (OUTPATIENT)
Dept: DENTISTRY | Facility: CLINIC | Age: 29
End: 2024-07-18

## 2024-07-18 PROBLEM — G43.009 MIGRAINE WITHOUT AURA: Status: ACTIVE | Noted: 2024-07-18

## 2024-07-18 RX ORDER — AMOXICILLIN 500 MG/1
500 CAPSULE ORAL
Qty: 21 CAPSULE | Refills: 0 | Status: SHIPPED | OUTPATIENT
Start: 2024-07-18 | End: 2024-07-25

## 2024-07-18 NOTE — PROGRESS NOTES
"Dental procedures in this visit     - LIMITED ORAL EVALUATION - PROBLEM FOCUSED (Completed)     Service provider: Van Lester DMD     Billing provider: Van Lester DMD     - INTRAORAL - PERIAPICAL FIRST RADIOGRAPHIC IMAGE 28 (Completed)     Service provider: Van Lester DMD     Billing provider: Van Lester DMD     - BITEWING - SINGLE RADIOGRAPHIC IMAGE 28 (Completed)     Service provider: Van Lester DMD     Billing provider: Van Lester DMD     - PROTECTIVE RESTORATION 28 (Completed)     Service provider: Van Lester DMD     Billing provider: Van Lester DMD     Subjective   Patient ID: Mei Steele is a 29 y.o. female.  No chief complaint on file.    HPI  The following portions of the chart were reviewed this encounter and updated as appropriate:    Tobacco  Allergies  Meds  Problems  Med Hx  Surg Hx  Fam Hx           Objective   Soft Tissue Exam  Findings added this encounter   Swelling of buccal mucosa on Right Buccal Mucosa      Dental Exam    Radiographic Interpretation:   Associated radiographs for today's visit were reviewed and finding(s) were discussed with the patient.   Findings include: PA tooth #28 PARL, BW #28 DO fractured amalgam / recurrent decay  Hard Tissue Exam:  Decay noted - see charting and treatment plan and Fractured restorations/teeth  Reference tooth chart for additional findings.    Assessment & Plan   Problem List Items Addressed This Visit    None  Visit Diagnoses       Encounter for dental examination    -  Primary    Relevant Orders    28 INTRAORAL - PERIAPICAL FIRST RADIOGRAPHIC IMAGE (Completed)    28 BITEWING - SINGLE RADIOGRAPHIC IMAGE (Completed)    28 PROTECTIVE RESTORATION (Completed)    LIMITED ORAL EVALUATION - PROBLEM FOCUSED (Completed)        28 yo female presents with chief complaint: \"my tooth on he lower right hurts a little, My gum down there " "seems swollen, I think I may have an infection.\" Upon clinical and radiographic exam, #28 presents with a defective and fractured DO amalgam with recurrent decay. Sensitive to buccal palpation and buccal swelling noted. #28 has diminished reaction to cold compared to #27, 29. Leading deferential is #28 partial necrosis. Recommendation is RCT, followed by core and crown. Today a protective restoration was place per the follow protocol:    Protective Filling    Prepped tooth #28 DO with 245 carbide on high speed. Selective Caries removed with round carbide on slow speed. Placed delong matrix. Isolation with cotton rolls and dri-angles    Scrubbed GC cavity conditioner into prep for 20 seconds, rinsed, dried. Restored with Fuji triage shade pink and light cured.    Refined with finishing burs, polished with enhance point. Verified occlusion and contacts. Pt left satisfied.    5 days post op, patient called stating swelling and pain continue. An Rx for amoxicillin was sent to her patient pharmacy. Palliative access will be performed next visit.    NV: #28 palliative access / instrumentation   "

## 2024-07-23 ENCOUNTER — OFFICE VISIT (OUTPATIENT)
Dept: DENTISTRY | Facility: CLINIC | Age: 29
End: 2024-07-23

## 2024-07-23 VITALS — DIASTOLIC BLOOD PRESSURE: 70 MMHG | HEART RATE: 71 BPM | SYSTOLIC BLOOD PRESSURE: 105 MMHG | TEMPERATURE: 99.1 F

## 2024-07-23 DIAGNOSIS — K04.7 DENTAL ABSCESS: Primary | ICD-10-CM

## 2024-07-23 DIAGNOSIS — K04.1 PULPAL NECROSIS: ICD-10-CM

## 2024-07-23 PROCEDURE — D9110 PALLIATIVE (EMERGENCY) TREATMENT OF DENTAL PAIN - MINOR PROCEDURE: HCPCS

## 2024-07-23 RX ORDER — IBUPROFEN 600 MG/1
600 TABLET ORAL EVERY 6 HOURS PRN
Qty: 28 TABLET | Refills: 0 | Status: SHIPPED | OUTPATIENT
Start: 2024-07-23 | End: 2024-07-30

## 2024-07-23 RX ORDER — ACETAMINOPHEN 500 MG
500 TABLET ORAL EVERY 6 HOURS PRN
Qty: 28 TABLET | Refills: 0 | Status: SHIPPED | OUTPATIENT
Start: 2024-07-23 | End: 2024-07-30

## 2024-07-23 NOTE — PROGRESS NOTES
Palliative    Mei Steele presents for palliative tx of #28. PMH reviewed, no changes.      Applied topical benzocaine, administered 1 carp 2% lido 1:100k epi via IANB and 1 carp 4% articaine 1:100k epi via buccal infiltration. Clamp and rubber dam placed.Pulp chamber accessed with 245 carbide.     Pulp removed and canal accessed with size 10, 15 K file and SX, S1, S2 with RC prep and NaOCl irrigation. Working length confirmed with 15 K and apex . Instrumented using S1 S2 to working length. Dried canal with paper points. Placed CaOH2, cotton pellet, and restored with IRM.     Pt will consider finances and decide on continuing RCT+post/core+crown OR Ext. Explained to pt that tooth is savable with crown and RCT and recommended saving tooth if financially possible. Explained to pt pros, cons, and timeline of options. Pt understands that RCT+Post/core can be placed to stabilize condition, but final crown can only be placed once entire oral condition is stable.      Canals: 1 (check for 2nd canal next visit)  Ref: adj cusp tips  WL: 21 mm  RF (taper): instrumented to S2, will continue to F3 next visit  ?  NV: Take WL radiograph with 15 K file, confirm no 2nd canal, instrument to F3

## 2024-09-03 ENCOUNTER — TELEPHONE (OUTPATIENT)
Dept: DENTISTRY | Facility: CLINIC | Age: 29
End: 2024-09-03

## 2024-09-03 NOTE — TELEPHONE ENCOUNTER
Attempted to call patient to reschedule appointment for today at 3:30 due to schedule changed. Offered soonest appointment for 10/04/2024 at 1pm (1 hour slot). Left office phone number for patient to call back.

## 2025-02-24 ENCOUNTER — OFFICE VISIT (OUTPATIENT)
Dept: DENTISTRY | Facility: CLINIC | Age: 30
End: 2025-02-24

## 2025-02-24 VITALS — HEART RATE: 60 BPM | DIASTOLIC BLOOD PRESSURE: 78 MMHG | SYSTOLIC BLOOD PRESSURE: 127 MMHG

## 2025-02-24 DIAGNOSIS — Z98.811 HISTORY OF ROOT CANAL TREATMENT: Primary | ICD-10-CM

## 2025-02-24 PROCEDURE — D3320 ENDODONTIC THERAPY, PREMOLAR TOOTH (EXCLUDING FINAL RESTORATION): HCPCS | Performed by: DENTIST

## 2025-02-24 NOTE — PROGRESS NOTES
Procedure Details  28  - ENDODONTIC THERAPY, PREMOLAR TOOTH (EXCLUDING FINAL RESTORATION)    Endodontics Note  2/24/2025  Subjective   Chief Complaint:Mei Steele No chief complaint on file.  .    Problem: Previously started RCT on #28.    Tooth (#)s: 28  Objective :Previously started RCT on #28  Subjective Findings: Patient does not have pain since last appointment  Symptoms: None  Signs: None    Objective Findings:  Pulp tests: Previously started endo  Periapical tests:  ---  Probing: ---    Results Review:  Radiographs revealed: periapical abscess    Procedure Note:  Anesthesia: 1 carp Lidocaine 2% with 1:100K epi as BLAYNE block.  Working length(s): 26 mm  Cleaning and shaping: WaveOne Gold red  Obturator: Guttacore red  Temporary: Cavit.  Assessment & Plan   1. History of root canal treatment  28 ENDODONTIC THERAPY, PREMOLAR TOOTH (EXCLUDING FINAL RESTORATION)    28 ENDODONTIC THERAPY, PREMOLAR TOOTH (EXCLUDING FINAL RESTORATION)          Periapical radiograph taken.    Evaluation: Guttacore to apex of tooth.     Additional Comments: Good prognosis. Patient informed that she has the option of a 2 surface filling or crown. Other caries were noted during this appointment and patient was informed that she should do a comprehensive exam.    Plan: Comp oral, treatment planning for #28  Next visit:Comp oral

## 2025-05-06 ENCOUNTER — OFFICE VISIT (OUTPATIENT)
Dept: DENTISTRY | Facility: CLINIC | Age: 30
End: 2025-05-06

## 2025-05-06 VITALS — DIASTOLIC BLOOD PRESSURE: 68 MMHG | SYSTOLIC BLOOD PRESSURE: 101 MMHG | HEART RATE: 76 BPM

## 2025-05-06 DIAGNOSIS — Z01.20 ENCOUNTER FOR DENTAL EXAMINATION: Primary | ICD-10-CM

## 2025-05-06 PROCEDURE — D0210 INTRAORAL - COMPLETE SERIES OF RADIOGRAPHIC IMAGES: HCPCS | Performed by: DENTIST

## 2025-05-06 PROCEDURE — D0150 COMPREHENSIVE ORAL EVALUATION - NEW OR ESTABLISHED PATIENT: HCPCS | Performed by: DENTIST

## 2025-05-06 NOTE — DENTAL PROCEDURE DETAILS
COMP EXAM, FMX, PROBE EXAM   REVIEWED MED HX: meds, allergies, health changes reviewed in EPIC  CHIEF CONCERN:     -Last dental visit was 2/24/25  PAIN SCALE:  0  ASA CLASS:  ASA 1 - Normal health patient  PLAQUE:  mild  CALCULUS: Localized  BLEEDING:  light  STAIN :  Light  PERIO: 2 4's, light bleeding. Recommendation for normal prophy.    Visual and Tactile Intraoral/ Extraoral evaluation: Oral and Oropharyngeal cancer evaluation. No findings     Dr. Severino -  Reviewed with patient clinical and radiographic findings and patient verbalized understanding. All questions and concerns addressed.     REFERRALS: None    CARIES FINDINGS:     #2 o  #3 mo, l  #15 mo  #18 mod, b  #28 do - patient informed that crown could be needed long term, but could also be completed with a filling.  #31 o       NEXT VISIT:   1)#18 mod,b  2) prophy    Last FMX :5/6/25

## 2025-05-06 NOTE — PROGRESS NOTES
Procedure Details   - COMPREHENSIVE ORAL EVALUATION - NEW OR ESTABLISHED PATIENT     COMP EXAM, FMX, PROBE EXAM   REVIEWED MED HX: meds, allergies, health changes reviewed in EPIC  CHIEF CONCERN:     -Last dental visit was 2/24/25  PAIN SCALE:  0  ASA CLASS:  ASA 1 - Normal health patient  PLAQUE:  mild  CALCULUS: Localized  BLEEDING:  light  STAIN :  Light  PERIO: 2 4's, light bleeding. Recommendation for normal prophy.    Visual and Tactile Intraoral/ Extraoral evaluation: Oral and Oropharyngeal cancer evaluation. No findings     Dr. Severino -  Reviewed with patient clinical and radiographic findings and patient verbalized understanding. All questions and concerns addressed.     REFERRALS: None    CARIES FINDINGS:     #2 o  #3 mo, l  #15 mo  #18 mod, b  #28 do - patient informed that crown could be needed long term, but could also be completed with a filling.  #31 o       NEXT VISIT:   1)#18 mod,b  2) prophy    Last FMX :5/6/25   - INTRAORAL - COMPLETE SERIES OF RADIOGRAPHIC IMAGES